# Patient Record
Sex: FEMALE | Race: WHITE | Employment: OTHER | URBAN - METROPOLITAN AREA
[De-identification: names, ages, dates, MRNs, and addresses within clinical notes are randomized per-mention and may not be internally consistent; named-entity substitution may affect disease eponyms.]

---

## 2019-01-01 ENCOUNTER — RECORDS - HEALTHEAST (OUTPATIENT)
Dept: LAB | Facility: CLINIC | Age: 84
End: 2019-01-01

## 2019-01-01 ENCOUNTER — TELEPHONE (OUTPATIENT)
Dept: GERIATRICS | Facility: CLINIC | Age: 84
End: 2019-01-01

## 2019-01-01 ENCOUNTER — ASSISTED LIVING VISIT (OUTPATIENT)
Dept: GERIATRICS | Facility: CLINIC | Age: 84
End: 2019-01-01
Payer: COMMERCIAL

## 2019-01-01 ENCOUNTER — TRANSFERRED RECORDS (OUTPATIENT)
Dept: HEALTH INFORMATION MANAGEMENT | Facility: CLINIC | Age: 84
End: 2019-01-01

## 2019-01-01 VITALS
BODY MASS INDEX: 33.67 KG/M2 | SYSTOLIC BLOOD PRESSURE: 146 MMHG | HEART RATE: 60 BPM | DIASTOLIC BLOOD PRESSURE: 60 MMHG | WEIGHT: 215 LBS | TEMPERATURE: 98.5 F

## 2019-01-01 VITALS
TEMPERATURE: 98 F | DIASTOLIC BLOOD PRESSURE: 69 MMHG | BODY MASS INDEX: 35.02 KG/M2 | HEART RATE: 62 BPM | SYSTOLIC BLOOD PRESSURE: 139 MMHG | RESPIRATION RATE: 18 BRPM | WEIGHT: 223.6 LBS

## 2019-01-01 DIAGNOSIS — B07.8 COMMON WART: Primary | ICD-10-CM

## 2019-01-01 DIAGNOSIS — I10 BENIGN ESSENTIAL HYPERTENSION: ICD-10-CM

## 2019-01-01 DIAGNOSIS — E55.9 VITAMIN D DEFICIENCY: Primary | ICD-10-CM

## 2019-01-01 DIAGNOSIS — F32.A DEPRESSION, UNSPECIFIED DEPRESSION TYPE: ICD-10-CM

## 2019-01-01 DIAGNOSIS — F32.A DEPRESSION, UNSPECIFIED DEPRESSION TYPE: Primary | ICD-10-CM

## 2019-01-01 DIAGNOSIS — E66.01 MORBID OBESITY (H): ICD-10-CM

## 2019-01-01 LAB
25(OH)D3 SERPL-MCNC: 8 NG/ML (ref 30–80)
ANION GAP SERPL CALCULATED.3IONS-SCNC: 7 MMOL/L (ref 5–18)
ANION GAP SERPL CALCULATED.3IONS-SCNC: 7 MMOL/L (ref 5–18)
BASOPHILS # BLD AUTO: 0 THOU/UL (ref 0–0.2)
BASOPHILS NFR BLD AUTO: 1 % (ref 0–2)
BUN SERPL-MCNC: 20 MG/DL (ref 8–28)
BUN SERPL-MCNC: 20 MG/DL (ref 8–28)
CALCIUM SERPL-MCNC: 9.2 MG/DL (ref 8.5–10.5)
CALCIUM SERPL-MCNC: 9.2 MG/DL (ref 8.5–10.5)
CHLORIDE BLD-SCNC: 109 MMOL/L (ref 98–107)
CHLORIDE SERPLBLD-SCNC: 109 MMOL/L (ref 98–107)
CO2 SERPL-SCNC: 27 MMOL/L (ref 22–31)
CO2 SERPL-SCNC: 27 MMOL/L (ref 22–31)
CREAT SERPL-MCNC: 0.89 MG/DL (ref 0.6–1.1)
CREAT SERPL-MCNC: 0.89 MG/DL (ref 0.6–1.1)
DIFFERENTIAL: NORMAL
EOSINOPHIL # BLD AUTO: 0 THOU/UL (ref 0–0.4)
EOSINOPHIL NFR BLD AUTO: 0 % (ref 0–6)
ERYTHROCYTE [DISTWIDTH] IN BLOOD BY AUTOMATED COUNT: 13.7 % (ref 11–14.5)
ERYTHROCYTE [DISTWIDTH] IN BLOOD BY AUTOMATED COUNT: 13.7 % (ref 11–14.5)
GFR SERPL CREATININE-BSD FRML MDRD: 59 ML/MIN/1.73M2
GFR SERPL CREATININE-BSD FRML MDRD: 59 ML/MIN/1.73M2
GLUCOSE BLD-MCNC: 124 MG/DL (ref 70–125)
GLUCOSE SERPL-MCNC: 124 MG/DL (ref 70–125)
HCT VFR BLD AUTO: 40.4 % (ref 35–47)
HCT VFR BLD AUTO: 40.4 % (ref 35–47)
HEMOGLOBIN: 13.4 G/DL (ref 12–16)
HGB BLD-MCNC: 13.4 G/DL (ref 12–16)
LYMPHOCYTES # BLD AUTO: 1.7 THOU/UL (ref 0.8–4.4)
LYMPHOCYTES NFR BLD AUTO: 26 % (ref 20–40)
MCH RBC QN AUTO: 33.2 PG (ref 27–34)
MCH RBC QN AUTO: 33.2 PG (ref 27–34)
MCHC RBC AUTO-ENTMCNC: 33.2 G/DL (ref 32–36)
MCHC RBC AUTO-ENTMCNC: 33.2 G/DL (ref 32–36)
MCV RBC AUTO: 100 FL (ref 80–100)
MCV RBC AUTO: 100 FL (ref 80–100)
MONOCYTES # BLD AUTO: 0.5 THOU/UL (ref 0–0.9)
MONOCYTES NFR BLD AUTO: 8 % (ref 2–10)
NEUTROPHILS # BLD AUTO: 4.1 THOU/UL (ref 2–7.7)
NEUTROPHILS NFR BLD AUTO: 65 % (ref 50–70)
PLATELET # BLD AUTO: 189 THOU/UL (ref 140–440)
PLATELET # BLD AUTO: 189 THOU/UL (ref 140–440)
PMV BLD AUTO: 11 FL (ref 8.5–12.5)
POTASSIUM BLD-SCNC: 3.8 MMOL/L (ref 3.5–5)
POTASSIUM SERPL-SCNC: 3.8 MMOL/L (ref 3.5–5)
RBC # BLD AUTO: 4.04 MILL/UL (ref 3.8–5.4)
RBC # BLD AUTO: 4.04 MILL/UL (ref 3.8–5.4)
SODIUM SERPL-SCNC: 143 MMOL/L (ref 136–145)
SODIUM SERPL-SCNC: 143 MMOL/L (ref 136–145)
WBC # BLD AUTO: 6.4 THOU/UL (ref 4–11)
WBC: 6.4 THOU/UL (ref 4–11)

## 2019-01-01 PROCEDURE — 17110 DESTRUCTION B9 LES UP TO 14: CPT | Performed by: NURSE PRACTITIONER

## 2019-01-01 RX ORDER — CHOLECALCIFEROL (VITAMIN D3) 25 MCG
TABLET ORAL
Qty: 28 TABLET | Refills: 98 | Status: SHIPPED | OUTPATIENT
Start: 2019-01-01 | End: 2020-01-01

## 2019-01-01 RX ORDER — SERTRALINE HYDROCHLORIDE 25 MG/1
25 TABLET, FILM COATED ORAL DAILY
COMMUNITY
End: 2019-01-01

## 2019-01-01 RX ORDER — AMLODIPINE BESYLATE 5 MG/1
2.5 TABLET ORAL AT BEDTIME
Qty: 28 TABLET | Refills: 98
Start: 2019-01-01 | End: 2020-01-01 | Stop reason: ALTCHOICE

## 2019-01-01 RX ORDER — SERTRALINE HYDROCHLORIDE 25 MG/1
TABLET, FILM COATED ORAL
Qty: 31 TABLET | Refills: 98 | Status: SHIPPED | OUTPATIENT
Start: 2019-01-01 | End: 2020-01-01 | Stop reason: ALTCHOICE

## 2019-03-12 RX ORDER — IBUPROFEN 400 MG/1
400 TABLET, FILM COATED ORAL 2 TIMES DAILY PRN
COMMUNITY
End: 2019-06-01 | Stop reason: ALTCHOICE

## 2019-03-12 RX ORDER — LANOLIN ALCOHOL/MO/W.PET/CERES
3 CREAM (GRAM) TOPICAL AT BEDTIME
COMMUNITY
End: 2020-01-01

## 2019-03-12 RX ORDER — LOSARTAN POTASSIUM 50 MG/1
50 TABLET ORAL 2 TIMES DAILY
COMMUNITY
End: 2019-07-02

## 2019-03-12 RX ORDER — ACETAMINOPHEN 500 MG
500 TABLET ORAL EVERY 6 HOURS PRN
COMMUNITY
End: 2019-06-01 | Stop reason: ALTCHOICE

## 2019-03-12 RX ORDER — AMOXICILLIN 250 MG
1 CAPSULE ORAL 2 TIMES DAILY PRN
COMMUNITY
End: 2019-06-01 | Stop reason: ALTCHOICE

## 2019-03-12 RX ORDER — METOPROLOL SUCCINATE 100 MG/1
100 TABLET, EXTENDED RELEASE ORAL DAILY
COMMUNITY
End: 2019-07-02

## 2019-03-12 RX ORDER — AMLODIPINE BESYLATE 5 MG/1
5 TABLET ORAL DAILY
COMMUNITY
End: 2019-05-28

## 2019-03-12 RX ORDER — LATANOPROST 50 UG/ML
1 SOLUTION/ DROPS OPHTHALMIC AT BEDTIME
COMMUNITY
End: 2019-11-02

## 2019-03-12 NOTE — PROGRESS NOTES
[place] [facility]  PRIMARY CARE PROVIDER AND CLINIC:  Dee Mock, APRN CNP, [address] / [place] [address]  Chief Complaint   Patient presents with     Establish Care     [place] Medical Record Number:  [id number]  Place of Service where encounter took place:  [facility] ([facility]) [[id number]]    Radha Smith  is a 89 year old  (12/14/1925), living in above facility since 12/21/18 and now choosing to change PCPs to [facility]. .  Admitted to this facility for  rehab, medical management and nursing care.    HPI:    HPI information obtained from: facility chart records, facility staff, patient report, [facility] chart review and Care Everywhere [facility] chart review.     Brief Summary of Hospital Course:     This is a 89-year-old female, with a past medical history significant for left basal ganglia hemorrhagic stroke in September 2018, stroke in 2011, vascular dementia, bradycardia, chronic systolic heart failure with EF 40-45% on 2/21/18, hypertension and glaucoma, who was admitted to [facility] 12/20/18 following hospitalization at [facility] 12/1/18 through 12/3/18 for acute encephalopathy. MRI and CT revealed no acute findings. Patient appeared to be at baseline. Daughter unable to care for patient at home.Discharged to [facility] T[facility] Critical access hospital.     While in the TCU 12/3/18 through 12/20/18, Metoprolol was decreased due to bradycardia and Losartan was initiated. Refused medications at times. Recommend 2WW although patient was noted to ambulate without assistive device at times. Tinetti Score 18/28. Unable to complete SLUMS and cognitive testing as patient was refusing and confused.     Updates on Status Since Skilled nursing Admission:     Daughter, Fariba, present during today's visit. Patient has lived in [place] her entire life. Has 3 daughters. Worked outside of the home at the [employer]. Had right  lower back pain yesterday, but this has since subsided. Has no other complaints of pain. Is able to state her age after thinking for some time.     Per daughter's report, who lives 1 minute from AL facility, patient is generally healthy. Requests patient only be seen when necessary for acute visits. Would like all paper information and lab reports made available to her.     CODE STATUS/ADVANCE DIRECTIVES DISCUSSION:   DNR/DNI  Patient's living condition: lives in an assisted living facility  ALLERGIES: Lisinopril; Morphine; Penicillins; and Sulfa drugs  PAST MEDICAL HISTORY:  has no past medical history on file.  PAST SURGICAL HISTORY:   has no past surgical history on file.  FAMILY HISTORY: family history is not on file.  SOCIAL HISTORY:       Post Discharge Medication Reconciliation Status: discharge medications reconciled and changed, per note/orders (see AVS)    Current Outpatient Medications   Medication Sig Dispense Refill     acetaminophen (TYLENOL) 500 MG tablet Take 500-1,000 mg by mouth every 6 hours as needed for mild pain       amLODIPine (NORVASC) 5 MG tablet Take 10 mg by mouth daily        ibuprofen (ADVIL/MOTRIN) 400 MG tablet Take 400 mg by mouth 2 times daily as needed for moderate pain       latanoprost (XALATAN) 0.005 % ophthalmic solution Place 1 drop into both eyes At Bedtime       losartan (COZAAR) 50 MG tablet Take 50 mg by mouth 2 times daily       melatonin 3 MG tablet Take 3 mg by mouth At Bedtime       metoprolol succinate ER (TOPROL-XL) 100 MG 24 hr tablet Take 100 mg by mouth daily Hold if SBP <100       senna-docusate (SENOKOT-S/PERICOLACE) 8.6-50 MG tablet Take 1 tablet by mouth 2 times daily as needed for constipation       ROS:  10 point ROS of systems including Constitutional, Eyes, Respiratory, Cardiovascular, Gastroenterology, Genitourinary, Integumentary, Musculoskeletal, Psychiatric were all negative except for pertinent positives noted in my HPI.    Vitals:  /61   Pulse  61   Temp 98.4  F (36.9  C)   Resp (!) 60   Wt 91.2 kg (201 lb)   SpO2 96%   Exam:  GENERAL APPEARANCE:  Alert, in no distress  ENT:  Mouth and posterior oropharynx normal, moist mucous membranes  EYES:  EOM, conjunctivae, lids, pupils and irises normal  RESP:  respiratory effort and palpation of chest normal, lungs clear to auscultation , no respiratory distress  CV:  Palpation and auscultation of heart done , regular rate and rhythm, no murmur, rub, or gallop  ABDOMEN:  normal bowel sounds, soft, nontender, no hepatosplenomegaly or other masses  M/S:   Active movement of bilateral upper and lower extremities. No edema.  SKIN:  Inspection of skin and subcutaneous tissue baseline, Palpation of skin and subcutaneous tissue baseline  NEURO:   Cranial nerves 2-12 are normal tested and grossly at patient's baseline  PSYCH:  memory impaired     Lab/Diagnostic data:  Labs done in SNF are in [employer]. Please refer to them using [employer] Everywhere.    ASSESSMENT/PLAN:  Vascular Dementia. Chronic, progressive. Resides in [facility]. No recent cognitive testing available to review as patient refused during most recent TCU stay. Able to state her name and age. Required prompting from daughter regarding her career outside the home. Staff to assist with meals and ADLs.     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 Per patient report, no residual effects from most recent stroke. Daughter does not report otherwise. Discussed hospice during hospitalization in September 2018, but patient appears to have made some progress in therapies.     Hypertension. Upon review of blood pressure over the past 5 days, systolic range from 142-178. Diastolic range from 68-87. Based on elevated blood pressures, discussed with daughter and patient increasing Amlodipine from 5 mg to 10 mg. Continue Metoprolol and Losartan as ordered. Blood pressures currently being monitored daily.      Bradycardia. Per history. Limited heart rates available to review since AL admission. Range 60s. Taking Metoprolol. Monitor heart rate monthly.     Chronic Systolic Heart Failure with EF 40-45% on 18. Monitor weights. Takes Metoprolol.    Glaucoma. Continue Latanoprost as ordered. Daughter reports a follow-up appointment with Ophthalmologist has been scheduled for April.     Insomnia. Continue Melatonin as ordered.    Constipation. Continue Senna-S as ordered.     Total time with an established patient visit in the assisted livin minutes including discussions about the POC and care coordination with the patient and familyFariba. Greater than 50% of total time spent with counseling and coordinating care due to review of past medical record, review of facility documentation and discussion with patient and daughter     Electronically signed by:  DAREN Nathan CNP

## 2019-03-13 ENCOUNTER — ASSISTED LIVING VISIT (OUTPATIENT)
Dept: GERIATRICS | Facility: CLINIC | Age: 84
End: 2019-03-13
Payer: COMMERCIAL

## 2019-03-13 VITALS
OXYGEN SATURATION: 96 % | HEART RATE: 61 BPM | SYSTOLIC BLOOD PRESSURE: 156 MMHG | WEIGHT: 201 LBS | RESPIRATION RATE: 60 BRPM | DIASTOLIC BLOOD PRESSURE: 61 MMHG | TEMPERATURE: 98.4 F

## 2019-03-13 DIAGNOSIS — G47.00 INSOMNIA, UNSPECIFIED TYPE: ICD-10-CM

## 2019-03-13 DIAGNOSIS — I10 BENIGN ESSENTIAL HYPERTENSION: ICD-10-CM

## 2019-03-13 DIAGNOSIS — F01.50 VASCULAR DEMENTIA WITHOUT BEHAVIORAL DISTURBANCE (H): Primary | ICD-10-CM

## 2019-03-13 DIAGNOSIS — H40.9 GLAUCOMA, UNSPECIFIED GLAUCOMA TYPE, UNSPECIFIED LATERALITY: ICD-10-CM

## 2019-03-13 DIAGNOSIS — K59.00 CONSTIPATION, UNSPECIFIED CONSTIPATION TYPE: ICD-10-CM

## 2019-03-13 DIAGNOSIS — R00.1 BRADYCARDIA: ICD-10-CM

## 2019-03-13 DIAGNOSIS — I50.22 CHRONIC SYSTOLIC HEART FAILURE (H): ICD-10-CM

## 2019-03-13 DIAGNOSIS — Z86.73 HISTORY OF CVA (CEREBROVASCULAR ACCIDENT): ICD-10-CM

## 2019-03-13 NOTE — LETTER
3/13/2019        RE: Radha Smith  [facility]  [address]  [place] [address]        [place] [facility]  PRIMARY CARE PROVIDER AND CLINIC:  Dee Mock, APRN CNP, [address] / [place] [address]  Chief Complaint   Patient presents with     Establish Care     [place] Medical Record Number:  [id number]  Place of Service where encounter took place:  [facility] ([facility]) [[id number]]    Radha Smith  is a 89 year old  (12/14/1925), living in above facility since 12/21/18 and now choosing to change PCPs to [facility]. .  Admitted to this facility for  rehab, medical management and nursing care.    HPI:    HPI information obtained from: facility chart records, facility staff, patient report, [facility] chart review and Care Everywhere [facility] chart review.     Brief Summary of Hospital Course:     This is a 89-year-old female, with a past medical history significant for left basal ganglia hemorrhagic stroke in September 2018, stroke in 2011, vascular dementia, bradycardia, chronic systolic heart failure with EF 40-45% on 2/21/18, hypertension and glaucoma, who was admitted to [facility] 12/20/18 following hospitalization at [facility] 12/1/18 through 12/3/18 for acute encephalopathy. MRI and CT revealed no acute findings. Patient appeared to be at baseline. Daughter unable to care for patient at home.Discharged to [facility] [place].     While in the TCU 12/3/18 through 12/20/18, Metoprolol was decreased due to bradycardia and Losartan was initiated. Refused medications at times. Recommend 2WW although patient was noted to ambulate without assistive device at times. Tinetti Score 18/28. Unable to complete SLUMS and cognitive testing as patient was refusing and confused.     Updates on Status Since Skilled nursing Admission:     Daughter, Fariba, present during today's visit. Patient has  lived in [place] her entire life. Has 3 daughters. Worked outside of the home at the [employer]. Had right lower back pain yesterday, but this has since subsided. Has no other complaints of pain. Is able to state her age after thinking for some time.     Per daughter's report, who lives 1 minute from AL facility, patient is generally healthy. Requests patient only be seen when necessary for acute visits. Would like all paper information and lab reports made available to her.     CODE STATUS/ADVANCE DIRECTIVES DISCUSSION:   DNR/DNI  Patient's living condition: lives in an assisted living facility  ALLERGIES: Lisinopril; Morphine; Penicillins; and Sulfa drugs  PAST MEDICAL HISTORY:  has no past medical history on file.  PAST SURGICAL HISTORY:   has no past surgical history on file.  FAMILY HISTORY: family history is not on file.  SOCIAL HISTORY:       Post Discharge Medication Reconciliation Status: discharge medications reconciled and changed, per note/orders (see AVS)    Current Outpatient Medications   Medication Sig Dispense Refill     acetaminophen (TYLENOL) 500 MG tablet Take 500-1,000 mg by mouth every 6 hours as needed for mild pain       amLODIPine (NORVASC) 5 MG tablet Take 10 mg by mouth daily        ibuprofen (ADVIL/MOTRIN) 400 MG tablet Take 400 mg by mouth 2 times daily as needed for moderate pain       latanoprost (XALATAN) 0.005 % ophthalmic solution Place 1 drop into both eyes At Bedtime       losartan (COZAAR) 50 MG tablet Take 50 mg by mouth 2 times daily       melatonin 3 MG tablet Take 3 mg by mouth At Bedtime       metoprolol succinate ER (TOPROL-XL) 100 MG 24 hr tablet Take 100 mg by mouth daily Hold if SBP <100       senna-docusate (SENOKOT-S/PERICOLACE) 8.6-50 MG tablet Take 1 tablet by mouth 2 times daily as needed for constipation       ROS:  10 point ROS of systems including Constitutional, Eyes, Respiratory, Cardiovascular, Gastroenterology, Genitourinary, Integumentary,  Musculoskeletal, Psychiatric were all negative except for pertinent positives noted in my HPI.    Vitals:  /61   Pulse 61   Temp 98.4  F (36.9  C)   Resp (!) 60   Wt 91.2 kg (201 lb)   SpO2 96%   Exam:  GENERAL APPEARANCE:  Alert, in no distress  ENT:  Mouth and posterior oropharynx normal, moist mucous membranes  EYES:  EOM, conjunctivae, lids, pupils and irises normal  RESP:  respiratory effort and palpation of chest normal, lungs clear to auscultation , no respiratory distress  CV:  Palpation and auscultation of heart done , regular rate and rhythm, no murmur, rub, or gallop  ABDOMEN:  normal bowel sounds, soft, nontender, no hepatosplenomegaly or other masses  M/S:   Active movement of bilateral upper and lower extremities. No edema.  SKIN:  Inspection of skin and subcutaneous tissue baseline, Palpation of skin and subcutaneous tissue baseline  NEURO:   Cranial nerves 2-12 are normal tested and grossly at patient's baseline  PSYCH:  memory impaired     Lab/Diagnostic data:  Labs done in SNF are in [place] [employer]. Please refer to them using [employer] Everywhere.    ASSESSMENT/PLAN:  Vascular Dementia. Chronic, progressive. Resides in [facility]. No recent cognitive testing available to review as patient refused during most recent TCU stay. Able to state her name and age. Required prompting from daughter regarding her career outside the home. Staff to assist with meals and ADLs.     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 Per patient report, no residual effects from most recent stroke. Daughter does not report otherwise. Discussed hospice during hospitalization in September 2018, but patient appears to have made some progress in therapies.     Hypertension. Upon review of blood pressure over the past 5 days, systolic range from 142-178. Diastolic range from 68-87. Based on elevated blood pressures, discussed with daughter and patient increasing  Amlodipine from 5 mg to 10 mg. Continue Metoprolol and Losartan as ordered. Blood pressures currently being monitored daily.     Bradycardia. Per history. Limited heart rates available to review since AL admission. Range 60s. Taking Metoprolol. Monitor heart rate monthly.     Chronic Systolic Heart Failure with EF 40-45% on 18. Monitor weights. Takes Metoprolol.    Glaucoma. Continue Latanoprost as ordered. Daughter reports a follow-up appointment with Ophthalmologist has been scheduled for April.     Insomnia. Continue Melatonin as ordered.    Constipation. Continue Senna-S as ordered.     Total time with an established patient visit in the assisted livin minutes including discussions about the POC and care coordination with the patient and family, Fariba. Greater than 50% of total time spent with counseling and coordinating care due to review of past medical record, review of facility documentation and discussion with patient and daughter     Electronically signed by:  DAREN Nathan CNP                       Sincerely,        DAREN Nathan CNP

## 2019-03-18 PROBLEM — Z76.89 HEALTH CARE HOME: Status: ACTIVE | Noted: 2019-03-18

## 2019-04-01 ENCOUNTER — PATIENT OUTREACH (OUTPATIENT)
Dept: GERIATRIC MEDICINE | Facility: CLINIC | Age: 84
End: 2019-04-01

## 2019-04-03 ENCOUNTER — ASSISTED LIVING VISIT (OUTPATIENT)
Dept: GERIATRICS | Facility: CLINIC | Age: 84
End: 2019-04-03
Payer: COMMERCIAL

## 2019-04-03 VITALS — SYSTOLIC BLOOD PRESSURE: 160 MMHG | DIASTOLIC BLOOD PRESSURE: 94 MMHG | WEIGHT: 192.8 LBS

## 2019-04-03 DIAGNOSIS — I10 BENIGN ESSENTIAL HYPERTENSION: Primary | ICD-10-CM

## 2019-04-03 DIAGNOSIS — I50.22 CHRONIC SYSTOLIC HEART FAILURE (H): ICD-10-CM

## 2019-04-03 DIAGNOSIS — R60.0 BILATERAL LOWER EXTREMITY EDEMA: ICD-10-CM

## 2019-04-03 NOTE — LETTER
4/3/2019        [facility]  [address]  [facility] [address]        [place] [facility]  [place] Medical Record Number:  [id number]  [facility] [[id number][facility]  Chief Complaint   Patient presents with     RECHECK     HPI:    Radha Smith  is a 89 year old (12/14/1925), who is being seen today for an episodic care visit.  HPI information obtained from: facility chart records, facility staff, patient report, [facility] chart review, Care Everywhere [facility] chart review and family/first contact daughter, Shawna, report. Today's concern is:    Hypertension. Amlodipine increased on 3/13/19 due to elevated blood pressures. Upon review of documentation over the past 7 days, systolic range from 124-168, most 120-140s. Diastolic range from 59-83.    Bilateral Lower Extremity Edema. Daughter requested patient be seen due to increased swelling in bilateral lower legs. Per daughter's report, patient noted to have swollen feet and ankles a few weeks ago. Recently saw Podiatry for redness that was noted between her 1st and 2nd toe on the right foot. Has been using a band aid on them. Per patient's report, denies shortness of breath. Is not sure she would want to wear compression stockings if indicated.     Chronic Systolic Heart Failure with EF 40-45% on 2/21/18. Weight 192.8 lbs on 12/21/18. No other weights available to review.     Past Medical and Surgical History reviewed in Epic today.    MEDICATIONS:  Current Outpatient Medications   Medication Sig Dispense Refill     acetaminophen (TYLENOL) 500 MG tablet Take 500 mg by mouth every 6 hours as needed for mild pain        amLODIPine (NORVASC) 5 MG tablet Take 10 mg by mouth daily        ibuprofen (ADVIL/MOTRIN) 400 MG tablet Take 400 mg by mouth 2 times daily as needed for moderate pain       latanoprost (XALATAN) 0.005 %  ophthalmic solution Place 1 drop into both eyes At Bedtime       losartan (COZAAR) 50 MG tablet Take 50 mg by mouth 2 times daily       melatonin 3 MG tablet Take 3 mg by mouth At Bedtime       metoprolol succinate ER (TOPROL-XL) 100 MG 24 hr tablet Take 100 mg by mouth daily Hold if SBP <100       senna-docusate (SENOKOT-S/PERICOLACE) 8.6-50 MG tablet Take 1 tablet by mouth 2 times daily as needed for constipation       REVIEW OF SYSTEMS:  Limited secondary to cognitive impairment but today pt reports no shortness of breath    Objective:  BP (!) 160/94   Wt 87.5 kg (192 lb 12.8 oz)   Exam:  GENERAL APPEARANCE:  Alert, in no distress  ENT:  Mouth and posterior oropharynx normal, moist mucous membranes  EYES:  EOM, conjunctivae, lids, pupils and irises normal  RESP:  respiratory effort and palpation of chest normal, lungs clear to auscultation , no respiratory distress  CV:  Palpation and auscultation of heart done , regular rate and rhythm, no murmur, rub, or gallop  ABDOMEN:  normal bowel sounds, soft, nontender, no hepatosplenomegaly or other masses  M/S:   Active movement of bilateral upper and lower extremities.Trace edema in bilateral ankles and feet..  SKIN:   Erythema noted on medial aspect of 1st and 2nd digit on right foot.   NEURO:   Cranial nerves 2-12 are normal tested and grossly at patient's baseline  PSYCH:  memory impaired     Labs:   Labs done in SNF are in [facility]. Please refer to them using [employer]/Care Everywhere.    ASSESSMENT/PLAN:  Hypertension. Most blood pressures at goal. Given age and co-morbidities, would hesitate to have blood pressure too low. Will decrease Amlodipine back to 5 mg daily due to increased edema in BLE and this being bothersome to patient and family. Continue to monitor blood pressures  Also on Metoprolol and Losartan.     Bilateral Lower Extremity Edema. As timing of edema correlates with increase in Amlodipine, will decrease Amlodipine back to 5 mg PO daily. No  recent weights available to review. Appears euvolemic on examination. Encourage elevation. Patient is not interested in compression stockings at this time.    Chronic Systolic Heart Failure with EF 40-45% on 2/21/18. Monitor weights. Requested staff take monthly weights at a minimum. Takes Metoprolol.     Electronically signed by:  DAREN Nathan CNP             Sincerely,        DAREN Nathan CNP

## 2019-04-03 NOTE — PROGRESS NOTES
[place] [facility]  [place] Medical Record Number:  [id number]  Place of Service where encounter took place:  [facility] ([facility]) [[id number]]  Chief Complaint   Patient presents with     RECHECK     HPI:    Radha Smith  is a 89 year old (12/14/1925), who is being seen today for an episodic care visit.  HPI information obtained from: facility chart records, facility staff, patient report, [facility] chart review, Care Everywhere [facility] chart review and family/first contact daughter, Shawna, report. Today's concern is:    Hypertension. Amlodipine increased on 3/13/19 due to elevated blood pressures. Upon review of documentation over the past 7 days, systolic range from 124-168, most 120-140s. Diastolic range from 59-83.    Bilateral Lower Extremity Edema. Daughter requested patient be seen due to increased swelling in bilateral lower legs. Per daughter's report, patient noted to have swollen feet and ankles a few weeks ago. Recently saw Podiatry for redness that was noted between her 1st and 2nd toe on the right foot. Has been using a band aid on them. Per patient's report, denies shortness of breath. Is not sure she would want to wear compression stockings if indicated.     Chronic Systolic Heart Failure with EF 40-45% on 2/21/18. Weight 192.8 lbs on 12/21/18. No other weights available to review.     Past Medical and Surgical History reviewed in Epic today.    MEDICATIONS:  Current Outpatient Medications   Medication Sig Dispense Refill     acetaminophen (TYLENOL) 500 MG tablet Take 500 mg by mouth every 6 hours as needed for mild pain        amLODIPine (NORVASC) 5 MG tablet Take 10 mg by mouth daily        ibuprofen (ADVIL/MOTRIN) 400 MG tablet Take 400 mg by mouth 2 times daily as needed for moderate pain       latanoprost (XALATAN) 0.005 % ophthalmic solution Place 1 drop into both eyes At Bedtime       losartan (COZAAR) 50 MG tablet Take 50 mg by mouth 2 times  daily       melatonin 3 MG tablet Take 3 mg by mouth At Bedtime       metoprolol succinate ER (TOPROL-XL) 100 MG 24 hr tablet Take 100 mg by mouth daily Hold if SBP <100       senna-docusate (SENOKOT-S/PERICOLACE) 8.6-50 MG tablet Take 1 tablet by mouth 2 times daily as needed for constipation       REVIEW OF SYSTEMS:  Limited secondary to cognitive impairment but today pt reports no shortness of breath    Objective:  BP (!) 160/94   Wt 87.5 kg (192 lb 12.8 oz)   Exam:  GENERAL APPEARANCE:  Alert, in no distress  ENT:  Mouth and posterior oropharynx normal, moist mucous membranes  EYES:  EOM, conjunctivae, lids, pupils and irises normal  RESP:  respiratory effort and palpation of chest normal, lungs clear to auscultation , no respiratory distress  CV:  Palpation and auscultation of heart done , regular rate and rhythm, no murmur, rub, or gallop  ABDOMEN:  normal bowel sounds, soft, nontender, no hepatosplenomegaly or other masses  M/S:   Active movement of bilateral upper and lower extremities.Trace edema in bilateral ankles and feet..  SKIN:  Erythema noted on medial aspect of 1st and 2nd digit on right foot.   NEURO:   Cranial nerves 2-12 are normal tested and grossly at patient's baseline  PSYCH:  memory impaired     Labs:   Labs done in SNF are in [facility]. Please refer to them using [employer]/Care Everywhere.    ASSESSMENT/PLAN:  Hypertension. Most blood pressures at goal. Given age and co-morbidities, would hesitate to have blood pressure too low. Will decrease Amlodipine back to 5 mg daily due to increased edema in BLE and this being bothersome to patient and family. Continue to monitor blood pressures  Also on Metoprolol and Losartan.     Bilateral Lower Extremity Edema. As timing of edema correlates with increase in Amlodipine, will decrease Amlodipine back to 5 mg PO daily. No recent weights available to review. Appears euvolemic on examination. Encourage elevation. Patient is not interested in  compression stockings at this time.    Chronic Systolic Heart Failure with EF 40-45% on 2/21/18. Monitor weights. Requested staff take monthly weights at a minimum. Takes Metoprolol.     Electronically signed by:  DAREN Nathan CNP

## 2019-05-28 VITALS
DIASTOLIC BLOOD PRESSURE: 64 MMHG | TEMPERATURE: 98.7 F | SYSTOLIC BLOOD PRESSURE: 118 MMHG | RESPIRATION RATE: 16 BRPM | HEART RATE: 63 BPM | WEIGHT: 192.8 LBS

## 2019-05-28 DIAGNOSIS — I10 BENIGN ESSENTIAL HYPERTENSION: Primary | ICD-10-CM

## 2019-05-28 RX ORDER — AMLODIPINE BESYLATE 5 MG/1
5 TABLET ORAL DAILY
Qty: 28 TABLET | Refills: 98 | Status: SHIPPED | OUTPATIENT
Start: 2019-05-28 | End: 2019-01-01

## 2019-05-28 RX ORDER — AMLODIPINE BESYLATE 5 MG/1
TABLET ORAL
Qty: 28 TABLET | Refills: 98 | Status: SHIPPED | OUTPATIENT
Start: 2019-05-28 | End: 2019-06-01

## 2019-05-28 NOTE — PROGRESS NOTES
"[facility]  PRIMARY CARE PROVIDER AND CLINIC:  Dee Mock, APRN CNP, [address] / [place] [address]  Chief Complaint   Patient presents with     Hospital F/U     [facility] Medical Record Number:  [id number]  Place of Service where encounter took place:  [facility] [[id number]]    Radha Smith  is a 89 year old  (12/14/1925), admitted to the above facility from  [facility]. Hospital stay 5/2/19 through 5/5/19. Patient admitted from [facility] 5/5/19 through 5/22/19. Admitted to this facility for  rehab, medical management and nursing care.    HPI:    HPI information obtained from: facility chart records, facility staff, patient report, [facility] chart review, Care Everywhere Epic chart review and family/first contact daughterFariba, report.     Brief Summary of Hospital Course:     This is a 89-year-old female, with a past medical history significant for left basal ganglia hemorrhagic stroke in September 2018, stroke in 2011, vascular dementia, bradycardia, chronic systolic heart failure with EF 40-45% on 2/21/18, hypertension and glaucoma, who was admitted to [facility] with right hip pain after a fall after tripping on carpet. An right pelvic and hip x-ray revealed \"1. Comminuted intertrochanteric fracture of the proximal right femur\". Ortho was consulted and an open reduction internal fixation of the right hip was performed on 5/2/19 by Dr. Mayer. Enoxaparin was ordered for 3 week for DVT prophylaxis. Hemoglobin 14 on 5/2/19 -> 10.7 on 5/5/19. Experienced agitation during hospitalization. Was discharged to [facility] for ongoing physical rehabilitation.    While at [facility], was unable to bear much weight on right leg due to weakness and pain at discharge. Requiring EZ stands for transfers. Hospital bed ordered at discharge. Was not eating well. Acetaminophen " "ordered for pain control. Weight 204 lbs on 5/21/19. Repeat Hemoglobin 11.3 on 5/7/19.    Updates on Status Since Skilled nursing Admission:     Complains of right hip pain \"occasionally\" when sitting for long periods of time and transferring to bed. Denies numbness or tingling.     Per daughter, Shawna, report, cannot get patient in and out of car. Uncertain how they will get patient to Ortho appointment in June. Concerned that there was redness outlined around the incisions. Also noted while her mother was sitting on the toilet that 1 suture was not removed.     CODE STATUS/ADVANCE DIRECTIVES DISCUSSION:   DNR only  Patient's living condition: lives in an assisted living facility  ALLERGIES: Lisinopril; Morphine; Penicillins; and Sulfa drugs  PAST MEDICAL HISTORY:  has no past medical history on file.  PAST SURGICAL HISTORY:   has no past surgical history on file.  FAMILY HISTORY: family history is not on file.  SOCIAL HISTORY:       Post Discharge Medication Reconciliation Status: discharge medications reconciled and changed, per note/orders (see AVS)    Current Outpatient Medications   Medication Sig Dispense Refill     acetaminophen (TYLENOL) 500 MG tablet Take 1,000 mg by mouth daily And BID PRN       amLODIPine (NORVASC) 5 MG tablet Take 1 tablet (5 mg) by mouth daily 28 tablet 98     latanoprost (XALATAN) 0.005 % ophthalmic solution Place 1 drop into both eyes At Bedtime       losartan (COZAAR) 50 MG tablet Take 50 mg by mouth 2 times daily       melatonin 3 MG tablet Take 3 mg by mouth At Bedtime       metoprolol succinate ER (TOPROL-XL) 100 MG 24 hr tablet Take 100 mg by mouth daily Hold if SBP <100       senna (SENOKOT) 8.6 MG tablet Take 1 tablet by mouth 2 times daily as needed for constipation       ROS:  Limited secondary to cognitive impairment but today pt reports pain as noted above    Vitals:  /64   Pulse 63   Temp 98.7  F (37.1  C)   Resp 16   Wt 87.5 kg (192 lb 12.8 oz) "   Exam:  GENERAL APPEARANCE:  Alert, in no distress  ENT:  Mouth and posterior oropharynx normal, moist mucous membranes  EYES:  EOM, conjunctivae, lids, pupils and irises normal  RESP:  respiratory effort and palpation of chest normal, lungs clear to auscultation , no respiratory distress  CV:  Palpation and auscultation of heart done , regular rate and rhythm, no murmur, rub, or gallop  ABDOMEN:  normal bowel sounds, soft, nontender, no hepatosplenomegaly or other masses  M/S:   Active movement of bilateral upper and lower extremities. No edema.  SKIN: 3 healing right hip incisions. Mild erythema noted within perimeter of marker outline. No drainage. 1 suture present  NEURO:   Cranial nerves 2-12 are normal tested and grossly at patient's baseline  PSYCH:  memory impaired    Lab/Diagnostic data:  Labs done in SNF are [facility]. Please refer to them using [employer]/Care Everywhere.    ASSESSMENT/PLAN:  Right Intertrochanteric Fracture of the Proximal Femur S/P ORIF on 5/2/19 by Dr. Hairston. [facility] H[facility]D to follow patient per daughter's request. Daughter prefers patient be seen on June 11 as this is around the time patient would be seen by Dr. Hairston. Repeat x-rays to be performed at that time. Completed 3 weeks of Enoxaparin injections for DVT prophylaxis. Will schedule Acetaminophen daily in the morning for pain control. Has hospital bed for transfers. Home Physical Therapy ordered. Home PT does not have a set schedule so cannot schedule Acetaminophen to correlate with PT times. Incision with no signs of infection. Mild erythema is likely secondary to skin being pulled together.    Anemia due Blood Loss, Acute. Secondary to above. Hemoglobin 14 on 5/2/19 -> 10.7 on 5/5/19. Repeat Hemoglobin 11.3 on 5/7/19. Will repeat Hemoglobin in the coming weeks to ensure stability.     Vascular Dementia. Chronic, progressive. Resides in secure memory care AL. Staff to assist with meals and ADLs.      History of Left Basal  Ganglia Hemorrhagic Stroke in 2018/CVA in . Discussed hospice during hospitalization in 2018, but patient appears to have made some progress in therapies. Patient and daughter report no residual effects.      Hypertension. Amlodipine increased from 5 mg to 10 mg on , but decreased back to 5 mg on 4/3/19 due to concerns related to increased edema. Monthly blood pressures. Continue Metoprolol as ordered.      Bradycardia. Per history. Limited heart rates available to review since AL admission. Range 60s. Taking Metoprolol. Monitor heart rate monthly.      Chronic Systolic Heart Failure with EF 40-45% on 18. Monitor weights. Takes Metoprolol. Last weight documented 204 lbs on 19 at TCU.      Glaucoma. Continue Latanoprost as ordered. Daughter reported a follow-up appointment with Ophthalmologist had been scheduled for April.      Insomnia. Continue Melatonin as ordered.     Constipation. Continue Senna as ordered    Total time with an established patient visit in the assisted livin minutes including discussions about the POC and care coordination with the patient and family, daughter Fariba. Greater than 50% of total time spent with counseling and coordinating care due to discussion with daughter, contacting home PT to determine visit schedule, communicating with [facility] PA-C, reviewing TCU notes and hospital notes     Electronically signed by:  DAREN Nathan CNP

## 2019-05-29 ENCOUNTER — ASSISTED LIVING VISIT (OUTPATIENT)
Dept: GERIATRICS | Facility: CLINIC | Age: 84
End: 2019-05-29
Payer: COMMERCIAL

## 2019-05-29 DIAGNOSIS — I10 BENIGN ESSENTIAL HYPERTENSION: ICD-10-CM

## 2019-05-29 DIAGNOSIS — H40.9 GLAUCOMA, UNSPECIFIED GLAUCOMA TYPE, UNSPECIFIED LATERALITY: ICD-10-CM

## 2019-05-29 DIAGNOSIS — Z47.89 AFTERCARE FOLLOWING SURGERY OF THE MUSCULOSKELETAL SYSTEM: Primary | ICD-10-CM

## 2019-05-29 DIAGNOSIS — G47.00 INSOMNIA, UNSPECIFIED TYPE: ICD-10-CM

## 2019-05-29 DIAGNOSIS — Z86.73 HISTORY OF CVA (CEREBROVASCULAR ACCIDENT): ICD-10-CM

## 2019-05-29 DIAGNOSIS — R00.1 BRADYCARDIA: ICD-10-CM

## 2019-05-29 DIAGNOSIS — K59.00 CONSTIPATION, UNSPECIFIED CONSTIPATION TYPE: ICD-10-CM

## 2019-05-29 DIAGNOSIS — D62 ANEMIA DUE TO BLOOD LOSS, ACUTE: ICD-10-CM

## 2019-05-29 DIAGNOSIS — I50.22 CHRONIC SYSTOLIC HEART FAILURE (H): ICD-10-CM

## 2019-05-29 DIAGNOSIS — F01.50 VASCULAR DEMENTIA WITHOUT BEHAVIORAL DISTURBANCE (H): ICD-10-CM

## 2019-05-29 NOTE — LETTER
"    5/29/2019        [facility]  [address]  [place] [address]        [facility]  PRIMARY CARE PROVIDER AND CLINIC:  Dee Mock, APRN CNP, [address] / [place] [address]  Chief Complaint   Patient presents with     Hospital F/U     [facility] Medical Record Number:  [id number]  Place of Service where encounter took place:  [facility] [[id number]]    Radha Smith  is a 89 year old  (12/14/1925), admitted to the above facility from  [facility]. Hospital stay 5/2/19 through 5/5/19. Patient admitted from [facility] 5/5/19 through 5/22/19. Admitted to this facility for  rehab, medical management and nursing care.    HPI:    HPI information obtained from: facility chart records, facility staff, patient report, [facility] chart review, Care Everywhere [facility] chart review and family/first contact daughterFariba, report.     Brief Summary of Hospital Course:     This is a 89-year-old female, with a past medical history significant for left basal ganglia hemorrhagic stroke in September 2018, stroke in 2011, vascular dementia, bradycardia, chronic systolic heart failure with EF 40-45% on 2/21/18, hypertension and glaucoma, who was admitted to [facility] with right hip pain after a fall after tripping on carpet. An right pelvic and hip x-ray revealed \"1. Comminuted intertrochanteric fracture of the proximal right femur\". Ortho was consulted and an open reduction internal fixation of the right hip was performed on 5/2/19 by Dr. Mayer. Enoxaparin was ordered for 3 week for DVT prophylaxis. Hemoglobin 14 on 5/2/19 -> 10.7 on 5/5/19. Experienced agitation during hospitalization. Was discharged to [facility] for ongoing physical rehabilitation.    While at [facility], was unable to bear much weight on right leg due to weakness and pain at " "discharge. Requiring EZ stands for transfers. Hospital bed ordered at discharge. Was not eating well. Acetaminophen ordered for pain control. Weight 204 lbs on 5/21/19. Repeat Hemoglobin 11.3 on 5/7/19.    Updates on Status Since Skilled nursing Admission:     Complains of right hip pain \"occasionally\" when sitting for long periods of time and transferring to bed. Denies numbness or tingling.     Per daughter, Shawna, report, cannot get patient in and out of car. Uncertain how they will get patient to Ortho appointment in June. Concerned that there was redness outlined around the incisions. Also noted while her mother was sitting on the toilet that 1 suture was not removed.     CODE STATUS/ADVANCE DIRECTIVES DISCUSSION:   DNR only  Patient's living condition: lives in an assisted living facility  ALLERGIES: Lisinopril; Morphine; Penicillins; and Sulfa drugs  PAST MEDICAL HISTORY:  has no past medical history on file.  PAST SURGICAL HISTORY:   has no past surgical history on file.  FAMILY HISTORY: family history is not on file.  SOCIAL HISTORY:       Post Discharge Medication Reconciliation Status: discharge medications reconciled and changed, per note/orders (see AVS)    Current Outpatient Medications   Medication Sig Dispense Refill     acetaminophen (TYLENOL) 500 MG tablet Take 1,000 mg by mouth daily And BID PRN       amLODIPine (NORVASC) 5 MG tablet Take 1 tablet (5 mg) by mouth daily 28 tablet 98     latanoprost (XALATAN) 0.005 % ophthalmic solution Place 1 drop into both eyes At Bedtime       losartan (COZAAR) 50 MG tablet Take 50 mg by mouth 2 times daily       melatonin 3 MG tablet Take 3 mg by mouth At Bedtime       metoprolol succinate ER (TOPROL-XL) 100 MG 24 hr tablet Take 100 mg by mouth daily Hold if SBP <100       senna (SENOKOT) 8.6 MG tablet Take 1 tablet by mouth 2 times daily as needed for constipation       ROS:  Limited secondary to cognitive impairment but today pt reports pain as noted " above    Vitals:  /64   Pulse 63   Temp 98.7  F (37.1  C)   Resp 16   Wt 87.5 kg (192 lb 12.8 oz)   Exam:  GENERAL APPEARANCE:  Alert, in no distress  ENT:  Mouth and posterior oropharynx normal, moist mucous membranes  EYES:  EOM, conjunctivae, lids, pupils and irises normal  RESP:  respiratory effort and palpation of chest normal, lungs clear to auscultation , no respiratory distress  CV:  Palpation and auscultation of heart done , regular rate and rhythm, no murmur, rub, or gallop  ABDOMEN:  normal bowel sounds, soft, nontender, no hepatosplenomegaly or other masses  M/S:   Active movement of bilateral upper and lower extremities. No edema.  SKIN:  3 healing right hip incisions. Mild erythema noted within perimeter of marker outline. No drainage. 1 suture present  NEURO:   Cranial nerves 2-12 are normal tested and grossly at patient's baseline  PSYCH:  memory impaired    Lab/Diagnostic data:  Labs done in SNF are in [facility]. Please refer to them using [employer]/Care Everywhere.    ASSESSMENT/PLAN:  Right Intertrochanteric Fracture of the Proximal Femur S/P ORIF on 5/2/19 by Dr. Hairston. [facility] HURD to follow patient per daughter's request. Daughter prefers patient be seen on June 11 as this is around the time patient would be seen by Dr. Hairston. Repeat x-rays to be performed at that time. Completed 3 weeks of Enoxaparin injections for DVT prophylaxis. Will schedule Acetaminophen daily in the morning for pain control. Has hospital bed for transfers. Home Physical Therapy ordered. Home PT does not have a set schedule so cannot schedule Acetaminophen to correlate with PT times. Incision with no signs of infection. Mild erythema is likely secondary to skin being pulled together.    Anemia due Blood Loss, Acute. Secondary to above. Hemoglobin 14 on 5/2/19 -> 10.7 on 5/5/19. Repeat Hemoglobin 11.3 on 5/7/19. Will repeat Hemoglobin in the coming weeks to ensure stability.     Vascular Dementia. Chronic,  progressive. Resides in [facility]. Staff to assist with meals and ADLs.      History of Left Basal Ganglia Hemorrhagic Stroke in 2018/CVA in . Discussed hospice during hospitalization in 2018, but patient appears to have made some progress in therapies. Patient and daughter report no residual effects.      Hypertension. Amlodipine increased from 5 mg to 10 mg on 3anem, but decreased back to 5 mg on 4/3/19 due to concerns related to increased edema. Monthly blood pressures. Continue Metoprolol and Losartan as ordered.      Bradycardia. Per history. Limited heart rates available to review since AL admission. Range 60s. Taking Metoprolol. Monitor heart rate monthly.      Chronic Systolic Heart Failure with EF 40-45% on 18. Monitor weights. Takes Metoprolol. Last weight documented 204 lbs on 19 at TCU.      Glaucoma. Continue Latanoprost as ordered. Daughter reported a follow-up appointment with Ophthalmologist had been scheduled for April.      Insomnia. Continue Melatonin as ordered.     Constipation. Continue Senna as ordered    Total time with an established patient visit in the assisted livin minutes including discussions about the POC and care coordination with the patient and family, daughter Fariba. Greater than 50% of total time spent with counseling and coordinating care due to discussion with daughter, contacting home PT to determine visit schedule, communicating with [facility] PA-C, reviewing TCU notes and hospital notes     Electronically signed by:  DAREN Nathan CNP                     Sincerely,        DAREN Nathan CNP

## 2019-06-01 RX ORDER — ACETAMINOPHEN 650 MG/20.3ML
975 LIQUID ORAL 3 TIMES DAILY PRN
COMMUNITY
End: 2020-01-01

## 2019-06-06 ENCOUNTER — TRANSFERRED RECORDS (OUTPATIENT)
Dept: HEALTH INFORMATION MANAGEMENT | Facility: CLINIC | Age: 84
End: 2019-06-06

## 2019-06-06 ENCOUNTER — TELEPHONE (OUTPATIENT)
Dept: GERIATRICS | Facility: CLINIC | Age: 84
End: 2019-06-06

## 2019-06-14 ENCOUNTER — PATIENT OUTREACH (OUTPATIENT)
Dept: GERIATRIC MEDICINE | Facility: CLINIC | Age: 84
End: 2019-06-14

## 2019-06-14 PROBLEM — Z76.89 HEALTH CARE HOME: Status: RESOLVED | Noted: 2019-03-18 | Resolved: 2019-06-14

## 2019-06-14 NOTE — PROGRESS NOTES
[employer] enrollee as of 4-1-19      Brenna [facility]NA[facility] Coordinator   [phone]

## 2019-06-14 NOTE — PROGRESS NOTES
6-14-19   [employer] chart review.   Member resides in Assisted Living and followed by onsite medical team for primary care.      Member had hospital stay 5/2/19 through 5/5/19. Patient admitted from [facility] TCU 5/5/19 through 5/22/19   and then discharge back to AL on 5-23.      CC was not notified until today with chart review so now MAGAN was completed but will open member to [facility] (Assisted Living)    Member identified and opened to case management per [facility] protocol.    PCP: Dee Mock  PCC: [facility]  Living arrangement:  Assisted Living apartment          Utilization History (last 12 months): ED , Inpatient, TCU stay     Medical History: No past medical history on file.      3 or 6 month follow up:  EPIC notes reviewed, call placed to AL facility for updates as needed.  UFS POC goals reviewed and updated.    Care Coordination Initial Assessment    Identity verified    Utilization:   Hospital Admits in last year: due to hip fracture and weakness     Health Maintenance Reviewed: NA    Current Medical Health Concerns:   Fracture     Plan:  CC will f/u with member via chart review as needed.      Brenna Peña MA [facility] Care Coordinator   [phone]

## 2019-06-18 ENCOUNTER — ASSISTED LIVING VISIT (OUTPATIENT)
Dept: GERIATRICS | Facility: CLINIC | Age: 84
End: 2019-06-18
Payer: COMMERCIAL

## 2019-06-18 ENCOUNTER — RECORDS - HEALTHEAST (OUTPATIENT)
Dept: LAB | Facility: CLINIC | Age: 84
End: 2019-06-18

## 2019-06-18 ENCOUNTER — TRANSFERRED RECORDS (OUTPATIENT)
Dept: HEALTH INFORMATION MANAGEMENT | Facility: CLINIC | Age: 84
End: 2019-06-18

## 2019-06-18 DIAGNOSIS — Z47.89 AFTERCARE FOLLOWING SURGERY OF THE MUSCULOSKELETAL SYSTEM: Primary | ICD-10-CM

## 2019-06-18 LAB
BASOPHILS # BLD AUTO: 0.1 THOU/UL (ref 0–0.2)
BASOPHILS NFR BLD AUTO: 1 % (ref 0–2)
DIFFERENTIAL: ABNORMAL
EOSINOPHIL # BLD AUTO: 0 THOU/UL (ref 0–0.4)
EOSINOPHIL NFR BLD AUTO: 0 % (ref 0–6)
ERYTHROCYTE [DISTWIDTH] IN BLOOD BY AUTOMATED COUNT: 12.7 % (ref 11–14.5)
ERYTHROCYTE [DISTWIDTH] IN BLOOD BY AUTOMATED COUNT: 12.7 % (ref 11–14.5)
HCT VFR BLD AUTO: 39.2 % (ref 35–47)
HCT VFR BLD AUTO: 39.2 % (ref 35–47)
HEMOGLOBIN: 12.5 G/DL (ref 12–16)
HGB BLD-MCNC: 12.5 G/DL (ref 12–16)
LYMPHOCYTES # BLD AUTO: 1.8 THOU/UL (ref 0.8–4.4)
LYMPHOCYTES NFR BLD AUTO: 23 % (ref 20–40)
MCH RBC QN AUTO: 31.7 PG (ref 27–34)
MCH RBC QN AUTO: 31.7 PG (ref 27–34)
MCHC RBC AUTO-ENTMCNC: 31.9 G/DL (ref 32–36)
MCHC RBC AUTO-ENTMCNC: 31.9 G/DL (ref 32–36)
MCV RBC AUTO: 100 FL (ref 80–100)
MCV RBC AUTO: 100 FL (ref 80–100)
MONOCYTES # BLD AUTO: 0.5 THOU/UL (ref 0–0.9)
MONOCYTES NFR BLD AUTO: 6 % (ref 2–10)
NEUTROPHILS # BLD AUTO: 5.3 THOU/UL (ref 2–7.7)
NEUTROPHILS NFR BLD AUTO: 70 % (ref 50–70)
PLATELET # BLD AUTO: 196 THOU/UL (ref 140–440)
PLATELET # BLD AUTO: 196 THOU/UL (ref 140–440)
PMV BLD AUTO: 10.3 FL (ref 8.5–12.5)
RBC # BLD AUTO: 3.94 MILL/UL (ref 3.8–5.4)
RBC # BLD AUTO: 3.94 MILL/UL (ref 3.8–5.4)
WBC # BLD AUTO: 7.4 THOU/UL (ref 4–11)
WBC: 7.7 THOU/UL (ref 4–11)

## 2019-06-18 NOTE — PROGRESS NOTES
"Ortho Nursing home visit    Radha Smith is a 89 year old female who resides at [facility].    Patient is seen today for Right hip fracture, now 6 weeks, S/P IM nail fixation for right inter-troch fracture; after fall. Patient also has left alysa- due to hip fracture;      No past medical history on file.   No past surgical history on file.     Allergies   Allergen Reactions     Lisinopril      Morphine      Penicillins      Sulfa Drugs       There were no vitals taken for this visit.     Exam: Able to stand with some help, mod, asst; of 1 to standing position with walker, no ability to ambulate today, however, patient stated \" I am walking\".    Allows PROM to right hip with no complaints, incisions all healed,  Neg Homans. CMS intact to RLE;            X-rays show; Long IM nail in good position;     ASSESSMENT / PLAN: Have left message with Dr. Florin boone office to review PPX x-rays done on-site, to determine if any changes in activity are needed. Goals, remain, no future falls, as patient has had 2 post-op.     It would be my eval today, that patient would not be independent with ambulation in the future.    [id number]  No diagnosis found.          [facility]  [phone]    "

## 2019-07-02 DIAGNOSIS — I10 BENIGN ESSENTIAL HYPERTENSION: Primary | ICD-10-CM

## 2019-07-03 RX ORDER — LOSARTAN POTASSIUM 50 MG/1
TABLET ORAL
Qty: 62 TABLET | Refills: 98 | Status: SHIPPED | OUTPATIENT
Start: 2019-07-03 | End: 2019-11-02

## 2019-07-03 RX ORDER — METOPROLOL SUCCINATE 100 MG/1
TABLET, EXTENDED RELEASE ORAL
Qty: 31 TABLET | Refills: 98 | Status: SHIPPED | OUTPATIENT
Start: 2019-07-03 | End: 2020-01-01 | Stop reason: ALTCHOICE

## 2019-07-05 DIAGNOSIS — H40.9 GLAUCOMA, UNSPECIFIED GLAUCOMA TYPE, UNSPECIFIED LATERALITY: Primary | ICD-10-CM

## 2019-07-07 RX ORDER — LATANOPROST 50 UG/ML
SOLUTION/ DROPS OPHTHALMIC
Qty: 2.5 ML | Refills: 97 | Status: SHIPPED | OUTPATIENT
Start: 2019-07-07 | End: 2020-01-01

## 2019-07-22 ENCOUNTER — TELEPHONE (OUTPATIENT)
Dept: GERIATRICS | Facility: CLINIC | Age: 84
End: 2019-07-22

## 2019-07-22 NOTE — TELEPHONE ENCOUNTER
Patient's daughter called re: requests that scheduled Tylenol be stopped as patient no longer has hip pain to previous extent. Routed request to primary NP Dee Mock.     Electronically signed by DAREN Etienne GNP

## 2019-09-04 ENCOUNTER — PATIENT OUTREACH (OUTPATIENT)
Dept: GERIATRIC MEDICINE | Facility: CLINIC | Age: 84
End: 2019-09-04

## 2019-09-04 NOTE — PROGRESS NOTES
9-4-19   CC did chart review for member since she was opened to . Member has been doing well since discharge back to AL at this time.     CC will close member to CM and goals met and CC will f/u as needed.   Brenna Peña MA [facility] Coordinator   [phone]

## 2019-10-07 ASSESSMENT — MIFFLIN-ST. JEOR: SCORE: 1379.3

## 2019-10-29 VITALS
WEIGHT: 207.6 LBS | HEIGHT: 67 IN | HEART RATE: 70 BPM | SYSTOLIC BLOOD PRESSURE: 146 MMHG | RESPIRATION RATE: 18 BRPM | TEMPERATURE: 96.2 F | DIASTOLIC BLOOD PRESSURE: 80 MMHG | BODY MASS INDEX: 32.58 KG/M2

## 2019-10-29 PROBLEM — G93.40 ENCEPHALOPATHY: Status: ACTIVE | Noted: 2018-12-04

## 2019-10-29 PROBLEM — T68.XXXA HYPOTHERMIA: Status: ACTIVE | Noted: 2018-02-21

## 2019-10-29 PROBLEM — R53.1 GENERALIZED WEAKNESS: Status: ACTIVE | Noted: 2018-02-21

## 2019-10-29 PROBLEM — R11.10 VOMITING: Status: ACTIVE | Noted: 2019-10-29

## 2019-10-29 PROBLEM — I10 ESSENTIAL HYPERTENSION: Status: ACTIVE | Noted: 2018-12-01

## 2019-10-29 PROBLEM — I42.0 DILATED CARDIOMYOPATHY (H): Status: ACTIVE | Noted: 2019-10-29

## 2019-10-29 PROBLEM — S72.009A HIP FRACTURE (H): Status: ACTIVE | Noted: 2019-05-02

## 2019-10-29 PROBLEM — M54.50 CHRONIC RIGHT-SIDED LOW BACK PAIN WITHOUT SCIATICA: Status: ACTIVE | Noted: 2018-12-01

## 2019-10-29 PROBLEM — F01.50 VASCULAR DEMENTIA WITHOUT BEHAVIORAL DISTURBANCE (H): Status: ACTIVE | Noted: 2018-12-04

## 2019-10-29 PROBLEM — I61.9 HEMORRHAGIC STROKE (H): Status: ACTIVE | Noted: 2018-09-03

## 2019-10-29 PROBLEM — T79.6XXA TRAUMATIC RHABDOMYOLYSIS (H): Status: ACTIVE | Noted: 2018-02-21

## 2019-10-29 PROBLEM — G89.29 CHRONIC RIGHT-SIDED LOW BACK PAIN WITHOUT SCIATICA: Status: ACTIVE | Noted: 2018-12-01

## 2019-10-29 PROBLEM — E87.20 LACTIC ACIDOSIS: Status: ACTIVE | Noted: 2018-02-21

## 2019-10-29 PROBLEM — Z51.5 PALLIATIVE CARE ENCOUNTER: Status: ACTIVE | Noted: 2019-10-29

## 2019-10-29 PROBLEM — Z86.73 HISTORY OF STROKE: Status: ACTIVE | Noted: 2018-12-02

## 2019-10-29 NOTE — PROGRESS NOTES
[place] [facility]  Chief Complaint   Patient presents with     Annual Comprehensive Exam Assisted Living     [place] Medical Record Number:  [id number]  Place of Service where encounter took place:  [facility] ([facility] [[id number]]    HPI:    Radha Smith  is a 89 year old  (12/14/1925), who is being seen today for an annual comprehensive visit. HPI information obtained from: facility chart records, facility staff, patient report and [facility] Epic chart review.  Today's concerns are:    Vascular Dementia. Able to make needs known. Has used Desitin instead of toothpaste to brush teeth in the past 6 months. Refused medications intermittently in the past. On 9/29/19, attempted to hit staff. No recent cognitive testing available to review.      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 Discussed hospice during hospitalization in September 2018, but patient appears to have made some progress in therapies. Patient and daughter report no residual effects.     History of Right Intertrochanteric Fracture of the Proximal Femur S/P ORIF on 5/2/19 by Dr. Hairston. Following a fall. Followed by [facility] PA-C Last seen 6/18/19.       Hypertension. Amlodipine increased from 5 mg to 10 mg on 3anemia/13/19, but decreased back to 5 mg on 4/3/19 due to concerns related to increased edema. Upon review of blood pressure over the past month, systolic range from 135-146. Diastolic range from 80-86.     Chronic Systolic Heart Failure with EF 40-45% on 2/21/18. Denies shortness of breath. Weights 192.8 lbs on 12/21/18 -> 230 on 6/3/19 -> 207.6 lbs on 10/7/19.      Glaucoma. No recent vision changes noted.     Insomnia. No reports of trouble sleeping.     Constipation. No reports of difficulty with bowels.    Recurrent Falls. Fell on 9/13/19. Found on the floor by staff. Reported sliding out of wheelchair. On 10/6/19, found sitting on floor in apartment next  to wheelchair.     Wart. Daughter questions growth on top of right hand. States its scraping things. Per patient report, is not painful.     ALLERGIES: Lisinopril; Morphine; Penicillins; and Sulfa drugs  PAST MEDICAL HISTORY:  has a past medical history of Glaucoma, Hypertension, Obesity, TIA (transient ischemic attack), and Vascular dementia with behavior disturbance (H) (12/04/2018).  PAST SURGICAL HISTORY:  has a past surgical history that includes hip surgery (2000).  IMMUNIZATIONS:  Immunization History   Administered Date(s) Administered     Pneumo Conj 13-V (2010&after) 12/05/2018     Pneumococcal 23 valent 12/06/2018     Above immunizations pulled from tidy. [facility]IC and facility records also reconciled. Outstanding information sent to  to update [employer].  Future immunizations needed:  TDAP and Will check with family when this was last administered.    Current Outpatient Medications   Medication Sig Dispense Refill     acetaminophen (TYLENOL) 500 MG tablet Take 1,000 mg by mouth 3 times daily as needed for pain        amLODIPine (NORVASC) 5 MG tablet Take 1 tablet (5 mg) by mouth daily 28 tablet 98     latanoprost (XALATAN) 0.005 % ophthalmic solution INSTILL ONE DROP IN EACH EYE EVERY NIGHT AT BEDTIME 2.5 mL 97     melatonin 3 MG tablet Take 3 mg by mouth At Bedtime       metoprolol succinate ER (TOPROL-XL) 100 MG 24 hr tablet TAKE 1 TABLET BY MOUTH ONCE DAILY 31 tablet 98     senna (SENOKOT) 8.6 MG tablet Take 1 tablet by mouth 2 times daily as needed for constipation       latanoprost (XALATAN) 0.005 % ophthalmic solution Place 1 drop into both eyes At Bedtime       losartan (COZAAR) 50 MG tablet TAKE 1 TABLET BY MOUTH TWICE DAILY 62 tablet 98     Case Management:  I have reviewed the Assisted Living care plan, current immunizations and preventive care/cancer screening. .Future cancer screening is not clinically indicated secondary to age/goals of care Patient's desire to return  "to the community is present, but is not able due to care needs . Current Level of Care is appropriate.    Advance Directive Discussion:    I reviewed the current advanced directives as reflected in EPIC, the POLST and the facility chart, and verified the congruency of orders. I contacted the first party daughter, Fariba and discussed the plan of Care.  I did review the advance directives with the resident.     Team Discussion:  I communicated with the appropriate disciplines involved with the Plan of Care:   Nursing    Patient's goal is pain control and comfort.  Information reviewed:  Medications, vital signs, orders, and nursing notes.    ROS:  4 point ROS including Respiratory, CV, GI and , other than that noted in the HPI,  is negative    Vitals:  BP (!) 146/80   Pulse 70   Temp 96.2  F (35.7  C)   Resp 18   Ht 1.702 m (5' 7\")   Wt 94.2 kg (207 lb 9.6 oz)   BMI 32.51 kg/m   Body mass index is 32.51 kg/m .  Exam:  GENERAL APPEARANCE:  Alert, in no distress  ENT:  Mouth and posterior oropharynx normal, moist mucous membranes  EYES:  EOM, conjunctivae, lids, pupils and irises normal  RESP:  respiratory effort and palpation of chest normal, lungs clear to auscultation , no respiratory distress  CV:  Palpation and auscultation of heart done , regular rate and rhythm, no murmur, rub, or gallop  ABDOMEN:  normal bowel sounds, soft, nontender, no hepatosplenomegaly or other masses  M/S:   Active movement of bilateral upper and lower extremities. Trace edema in BLE.  SKIN:  scaly raised papule on dorsal aspect of right hand  NEURO:   Cranial nerves 2-12 are normal tested and grossly at patient's baseline  PSYCH:  oriented to person     Lab/Diagnostic data:   Labs done in SNF are in [facility]. Please refer to them using [employer]/Care Everywhere.    ASSESSMENT/PLAN  Vascular Dementia. Chronic, progressive. Resides in secure memory care AL. Staff to assist with meals and ADLs.      History of Left Basal Ganglia " Hemorrhagic Stroke in September 2018/CVA in 2011. Discussed hospice during hospitalization in September 2018, but patient appears to have made some progress in therapies. Patient and daughter report no residual effects.     History of Right Intertrochanteric Fracture of the Proximal Femur S/P ORIF on 5/2/19 by Dr. Hairston. Following a fall. Staff to continue to monitor help prevent future falls.      Hypertension. Amlodipine increased from 5 mg to 10 mg on 3/13/19, but decreased back to 5 mg on 4/3/19 due to concerns related to increased edema. Blood pressures < 150/90. Monthly blood pressures monthly. Continue Metoprolol and Losartan as ordered.      Chronic Systolic Heart Failure with EF 40-45% on 2/21/18. Weights variable upon review of documentation since AL admission. Appears euvolemic on exam. Monitor weights to establish trend. Takes Metoprolol.      Glaucoma. Continue Latanoprost as ordered.      Insomnia. Continue Melatonin as ordered.     Constipation. Continue Senna as ordered    Recurrent Falls. Staff to continue to provide assistance as needed to help prevent falls.     Wart. On dorsal aspect of right hand. Will get Nitrogen from office and freeze wart.     Electronically signed by:  DAREN Nathan CNP

## 2019-10-30 ENCOUNTER — ASSISTED LIVING VISIT (OUTPATIENT)
Dept: GERIATRICS | Facility: CLINIC | Age: 84
End: 2019-10-30
Payer: COMMERCIAL

## 2019-10-30 DIAGNOSIS — I10 HYPERTENSION, UNSPECIFIED TYPE: ICD-10-CM

## 2019-10-30 DIAGNOSIS — F01.518 VASCULAR DEMENTIA WITH BEHAVIOR DISTURBANCE (H): Primary | ICD-10-CM

## 2019-10-30 DIAGNOSIS — B07.9 VIRAL WARTS, UNSPECIFIED TYPE: ICD-10-CM

## 2019-10-30 DIAGNOSIS — G47.00 INSOMNIA, UNSPECIFIED TYPE: ICD-10-CM

## 2019-10-30 DIAGNOSIS — R29.6 RECURRENT FALLS: ICD-10-CM

## 2019-10-30 DIAGNOSIS — Z86.73 HISTORY OF CVA (CEREBROVASCULAR ACCIDENT): ICD-10-CM

## 2019-10-30 DIAGNOSIS — K59.00 CONSTIPATION, UNSPECIFIED CONSTIPATION TYPE: ICD-10-CM

## 2019-10-30 DIAGNOSIS — H40.9 GLAUCOMA, UNSPECIFIED GLAUCOMA TYPE, UNSPECIFIED LATERALITY: ICD-10-CM

## 2019-10-30 DIAGNOSIS — I50.22 CHRONIC SYSTOLIC HEART FAILURE (H): ICD-10-CM

## 2019-10-30 NOTE — LETTER
10/30/2019        RE: Radha Smith  [address] [address]        [place] [facility]  Chief Complaint   Patient presents with     Annual Comprehensive Exam Assisted Living     [place] Medical Record Number:  [id number]  Place of Service where encounter took place:  [facility] ([facility] [[id number]]    HPI:    Radha Smith  is a 89 year old  (12/14/1925), who is being seen today for an annual comprehensive visit. HPI information obtained from: facility chart records, facility staff, patient report and [facility] chart review.  Today's concerns are:    Vascular Dementia. Able to make needs known. Has used Desitin instead of toothpaste to brush teeth in the past 6 months. Refused medications intermittently in the past. On 9/29/19, attempted to hit staff. No recent cognitive testing available to review.      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 Discussed hospice during hospitalization in September 2018, but patient appears to have made some progress in therapies. Patient and daughter report no residual effects.     History of Right Intertrochanteric Fracture of the Proximal Femur S/P ORIF on 5/2/19 by Dr. Hairston. Following a fall. Followed by [facility] PA-C Last seen 6/18/19.       Hypertension. Amlodipine increased from 5 mg to 10 mg on 3anemia/13/19, but decreased back to 5 mg on 4/3/19 due to concerns related to increased edema.  Upon review of blood pressure over the past month, systolic range from 135-146. Diastolic range from 80-86.     Chronic Systolic Heart Failure with EF 40-45% on 2/21/18. Denies shortness of breath. Weights 192.8 lbs on 12/21/18 -> 230 on 6/3/19 -> 207.6 lbs on 10/7/19.      Glaucoma. No recent vision changes noted.     Insomnia. No reports of trouble sleeping.     Constipation. No reports of difficulty with bowels.    Recurrent Falls. Fell on 9/13/19. Found on the floor by staff.  Reported sliding out of wheelchair. On 10/6/19, found sitting on floor in apartment next to wheelchair.     Wart. Daughter questions growth on top of right hand. States its scraping things. Per patient report, is not painful.     ALLERGIES: Lisinopril; Morphine; Penicillins; and Sulfa drugs  PAST MEDICAL HISTORY:  has a past medical history of Glaucoma, Hypertension, Obesity, TIA (transient ischemic attack), and Vascular dementia with behavior disturbance (H) (12/04/2018).  PAST SURGICAL HISTORY:  has a past surgical history that includes hip surgery (2000).  IMMUNIZATIONS:  Immunization History   Administered Date(s) Administered     Pneumo Conj 13-V (2010&after) 12/05/2018     Pneumococcal 23 valent 12/06/2018     Above immunizations pulled from [employer]. MIIC and facility records also reconciled. Outstanding information sent to  to update [employer].  Future immunizations needed:  TDAP and Will check with family when this was last administered.    Current Outpatient Medications   Medication Sig Dispense Refill     acetaminophen (TYLENOL) 500 MG tablet Take 1,000 mg by mouth 3 times daily as needed for pain        amLODIPine (NORVASC) 5 MG tablet Take 1 tablet (5 mg) by mouth daily 28 tablet 98     latanoprost (XALATAN) 0.005 % ophthalmic solution INSTILL ONE DROP IN EACH EYE EVERY NIGHT AT BEDTIME 2.5 mL 97     melatonin 3 MG tablet Take 3 mg by mouth At Bedtime       metoprolol succinate ER (TOPROL-XL) 100 MG 24 hr tablet TAKE 1 TABLET BY MOUTH ONCE DAILY 31 tablet 98     senna (SENOKOT) 8.6 MG tablet Take 1 tablet by mouth 2 times daily as needed for constipation       latanoprost (XALATAN) 0.005 % ophthalmic solution Place 1 drop into both eyes At Bedtime       losartan (COZAAR) 50 MG tablet TAKE 1 TABLET BY MOUTH TWICE DAILY 62 tablet 98     Case Management:  I have reviewed the Assisted Living care plan, current immunizations and preventive care/cancer screening. .Future cancer  "screening is not clinically indicated secondary to age/goals of care Patient's desire to return to the community is present, but is not able due to care needs . Current Level of Care is appropriate.    Advance Directive Discussion:    I reviewed the current advanced directives as reflected in EPIC, the POLST and the facility chart, and verified the congruency of orders. I contacted the first party daughter, Fariba and discussed the plan of Care.  I did review the advance directives with the resident.     Team Discussion:  I communicated with the appropriate disciplines involved with the Plan of Care:   Nursing    Patient's goal is pain control and comfort.  Information reviewed:  Medications, vital signs, orders, and nursing notes.    ROS:  4 point ROS including Respiratory, CV, GI and , other than that noted in the HPI,  is negative    Vitals:  BP (!) 146/80   Pulse 70   Temp 96.2  F (35.7  C)   Resp 18   Ht 1.702 m (5' 7\")   Wt 94.2 kg (207 lb 9.6 oz)   BMI 32.51 kg/m    Body mass index is 32.51 kg/m .  Exam:  GENERAL APPEARANCE:  Alert, in no distress  ENT:  Mouth and posterior oropharynx normal, moist mucous membranes  EYES:  EOM, conjunctivae, lids, pupils and irises normal  RESP:  respiratory effort and palpation of chest normal, lungs clear to auscultation , no respiratory distress  CV:  Palpation and auscultation of heart done , regular rate and rhythm, no murmur, rub, or gallop  ABDOMEN:  normal bowel sounds, soft, nontender, no hepatosplenomegaly or other masses  M/S:   Active movement of bilateral upper and lower extremities. Trace edema in BLE.  SKIN:  scaly raised papule on dorsal aspect of right hand  NEURO:   Cranial nerves 2-12 are normal tested and grossly at patient's baseline  PSYCH:  oriented to person     Lab/Diagnostic data:   Labs done in SNF are in [facility]. Please refer to them using [employer]/Care Everywhere.    ASSESSMENT/PLAN  Vascular Dementia. Chronic, progressive. Resides in " [facility] memory [facility]. Staff to assist with meals and ADLs.      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 Discussed hospice during hospitalization in September 2018, but patient appears to have made some progress in therapies. Patient and daughter report no residual effects.     History of Right Intertrochanteric Fracture of the Proximal Femur S/P ORIF on 5/2/19 by Dr. Hairston. Following a fall. Staff to continue to monitor help prevent future falls.      Hypertension. Amlodipine increased from 5 mg to 10 mg on 3/13/19, but decreased back to 5 mg on 4/3/19 due to concerns related to increased edema.  Blood pressures < 150/90. Monthly blood pressures monthly. Continue Metoprolol and Losartan as ordered.      Chronic Systolic Heart Failure with EF 40-45% on 2/21/18. Weights variable upon review of documentation since AL admission. Appears euvolemic on exam. Monitor weights to establish trend. Takes Metoprolol.      Glaucoma. Continue Latanoprost as ordered.      Insomnia. Continue Melatonin as ordered.     Constipation. Continue Senna as ordered    Recurrent Falls. Staff to continue to provide assistance as needed to help prevent falls.     Wart. On dorsal aspect of right hand. Will get Nitrogen from office and freeze wart.     Electronically signed by:  DRAEN Nathan CNP        Sincerely,        DAREN Nathan CNP

## 2019-11-13 NOTE — PROGRESS NOTES
[place] [facility]  [facility] Record Number:  [id number]  Place of Service where encounter took place:  [facility] ([facility]) [[id number]]  Chief Complaint   Patient presents with     RECHECK     HPI:    Radha Smith  is a 89 year old (12/14/1925), who is being seen today for an episodic care visit.  HPI information obtained from: facility chart records, facility staff, patient report and [facility] chart review. Today's concern is:    Common Wart. First noted on 10/30/19 visit on right hand. Today, patient is in agreement with cryotherapy using Veruca Freeze to remove wart from right hand. Contacted daughter, Chapis. Previously via telephone who was in agreement with having wart removed. Did not feel daughter needed to be present during visit.     Morbid Obesity. BMI 35.02. Upon review of documentation, BMI 30-35 over the past year. Weights 192.8 lbs on 12/21/18 -> 230 on 6/3/19 -> 207.6 lbs on 10/7/19 -> 210.2 lbs on 11/4/19 -> 223 lbs on 11/12/19.    Depression. Per daughter's report, patient has been more depressed lately. Has been crying more often. Sitting in room alone with shades down at times. Used to love to go to the music activity and is now leaving group early. States her mother was like this in the past after her  passed away in 2000. Was on Zoloft at the time with improvement in mood. Daughter requests this be re-initiated.     Past Medical and Surgical History reviewed in Epic today.    MEDICATIONS:  Current Outpatient Medications   Medication Sig Dispense Refill     acetaminophen (TYLENOL) 500 MG tablet Take 1,000 mg by mouth 3 times daily as needed for pain        amLODIPine (NORVASC) 5 MG tablet Take 1 tablet (5 mg) by mouth daily 28 tablet 98     latanoprost (XALATAN) 0.005 % ophthalmic solution INSTILL ONE DROP IN EACH EYE EVERY NIGHT AT BEDTIME 2.5 mL 97     melatonin 3 MG tablet Take 3 mg by mouth At Bedtime       metoprolol  succinate ER (TOPROL-XL) 100 MG 24 hr tablet TAKE 1 TABLET BY MOUTH ONCE DAILY 31 tablet 98     senna (SENOKOT) 8.6 MG tablet Take 1 tablet by mouth 2 times daily as needed for constipation       REVIEW OF SYSTEMS:  4 point ROS including Respiratory, CV, GI and , other than that noted in the HPI,  is negative    Objective:  /69   Pulse 62   Temp 98  F (36.7  C)   Resp 18   Wt 101.4 kg (223 lb 9.6 oz)   BMI 35.02 kg/m    Exam:  GENERAL APPEARANCE:  Alert, in no distress  ENT:  Mouth and posterior oropharynx normal, moist mucous membranes  EYES:  EOM, conjunctivae, lids, pupils and irises normal  RESP: No respiratory distress  M/S:   Active movement of bilateral upper and lower extremities.   SKIN:  Wart on dorsal aspect of right hand near near metacarpals  NEURO:   Cranial nerves 2-12 are normal tested and grossly at patient's baseline  PSYCH:  oriented to person    Labs:   Labs done in SNF are in [facility]. Please refer to them using [employer] Everywhere.    ASSESSMENT/PLAN:  Common Wart. Using Veruca Freeze Cryotherapy, applied cryobud to right hand wart for 25 seconds. Contacted daughter and explained if unsuccessful, will repeat treatment.    Morbid Obesity. Weights increased over the past month, but down from 6 months ago. Question accuracy of most recent weight given 13 lbs weight gain in ~ 1 week. Per patient and daughter's report in previous discussions, patient enjoys food at the facility. Appears euvolemic on exam. Continue to monitor weights.    Depression. Crying, isolating, less interest in activities. Will re-initiate Sertraline 25 mg PO every day after discussion with daughter. Repeat BMP in 2 week to ensure stability given side effect of hyponatremia.     Electronically signed by:  DAREN Nathan CNP

## 2019-11-13 NOTE — LETTER
11/13/2019        RE: Radha Smith  [address] [address]        [place] [facility]  [place] Medical Record Number:  [id number]  Place of Service where encounter took place:  [facility] [[id number]]  Chief Complaint   Patient presents with     RECHECK     HPI:    Radha Smith  is a 89 year old (12/14/1925), who is being seen today for an episodic care visit.  HPI information obtained from: facility chart records, facility staff, patient report and [facility] chart review. Today's concern is:    Common Wart. First noted on 10/30/19 visit on right hand. Today, patient is in agreement with cryotherapy using Veruca Freeze to remove wart from right hand. Contacted daughter, Chapis. Previously via telephone who was in agreement with having wart removed. Did not feel daughter needed to be present during visit.     Morbid Obesity. BMI 35.02. Upon review of documentation, BMI 30-35 over the past year. Weights 192.8 lbs on 12/21/18 -> 230 on 6/3/19 -> 207.6 lbs on 10/7/19 -> 210.2 lbs on 11/4/19 -> 223 lbs on 11/12/19.    Depression. Per daughter's report, patient has been more depressed lately. Has been crying more often. Sitting in room alone with shades down at times. Used to love to go to the music activity and is now leaving group early. States her mother was like this in the past after her  passed away in 2000. Was on Zoloft at the time with improvement in mood. Daughter requests this be re-initiated.     Past Medical and Surgical History reviewed in Epic today.    MEDICATIONS:  Current Outpatient Medications   Medication Sig Dispense Refill     acetaminophen (TYLENOL) 500 MG tablet Take 1,000 mg by mouth 3 times daily as needed for pain        amLODIPine (NORVASC) 5 MG tablet Take 1 tablet (5 mg) by mouth daily 28 tablet 98     latanoprost (XALATAN) 0.005 % ophthalmic solution INSTILL ONE DROP IN EACH EYE EVERY NIGHT AT BEDTIME  2.5 mL 97     melatonin 3 MG tablet Take 3 mg by mouth At Bedtime       metoprolol succinate ER (TOPROL-XL) 100 MG 24 hr tablet TAKE 1 TABLET BY MOUTH ONCE DAILY 31 tablet 98     senna (SENOKOT) 8.6 MG tablet Take 1 tablet by mouth 2 times daily as needed for constipation       REVIEW OF SYSTEMS:  4 point ROS including Respiratory, CV, GI and , other than that noted in the HPI,  is negative    Objective:  /69   Pulse 62   Temp 98  F (36.7  C)   Resp 18   Wt 101.4 kg (223 lb 9.6 oz)   BMI 35.02 kg/m     Exam:  GENERAL APPEARANCE:  Alert, in no distress  ENT:  Mouth and posterior oropharynx normal, moist mucous membranes  EYES:  EOM, conjunctivae, lids, pupils and irises normal  RESP:  No respiratory distress  M/S:   Active movement of bilateral upper and lower extremities.   SKIN:   Wart on dorsal aspect of right hand near near metacarpals  NEURO:   Cranial nerves 2-12 are normal tested and grossly at patient's baseline  PSYCH:  oriented to person    Labs:   [facility]F [facility]. Please refer to them using [employer] Everywhere.    ASSESSMENT/PLAN:  Common Wart. Using Veruca Freeze Cryotherapy, applied cryobud to right hand wart for 25 seconds. Contacted daughter and explained if unsuccessful, will repeat treatment.    Morbid Obesity. Weights increased over the past month, but down from 6 months ago. Question accuracy of most recent weight given 13 lbs weight gain in ~ 1 week. Per patient and daughter's report in previous discussions, patient enjoys food at the facility. Appears euvolemic on exam. Continue to monitor weights.    Depression. Crying, isolating, less interest in activities. Will re-initiate Sertraline 25 mg PO every day after discussion with daughter. Repeat BMP in 2 week to ensure stability given side effect of hyponatremia.     Electronically signed by:  DAREN Nathan CNP             Sincerely,        DAREN Nathan CNP

## 2019-11-17 PROBLEM — E66.01 MORBID OBESITY (H): Status: ACTIVE | Noted: 2019-01-01

## 2019-12-11 NOTE — LETTER
12/11/2019        RE: Radha Smiht  [address] [facility] [address]        [facility]  [place] Medical Record Number:  [id number]  Place of Service where encounter took place:  [facility] ([facility]) [[id number]]  Chief Complaint   Patient presents with     RECHECK     HPI:    Radha Smith  is a 89 year old (12/14/1925), who is being seen today for an episodic care visit.  HPI information obtained from: facility chart records, facility staff, patient report, [facility] chart review and family/first contact daughterChapis report. Today's concern is:    Common Wart. First noted on 10/30/19 visit on right hand. Today, patient is in agreement with cryotherapy using Veruca Freeze to remove wart from right hand. Contacted daughter, Chapis, prior to visit via telephone who was in agreement with having wart removed. Did not feel daughter needed to be present during visit. Used cryotherapy to remove wart on 11/13/19 and was unsuccessful. Will re-treat with Cryotherapy today.     Past Medical and Surgical History reviewed in Epic today.    MEDICATIONS:  Current Outpatient Medications   Medication Sig Dispense Refill     acetaminophen (TYLENOL) 500 MG tablet Take 1,000 mg by mouth 3 times daily as needed for pain        amLODIPine (NORVASC) 5 MG tablet Take 1 tablet (5 mg) by mouth daily 28 tablet 98     cholecalciferol (VITAMIN D3) 1000 units (25 mcg) capsule Take 1 capsule by mouth daily       latanoprost (XALATAN) 0.005 % ophthalmic solution INSTILL ONE DROP IN EACH EYE EVERY NIGHT AT BEDTIME 2.5 mL 97     melatonin 3 MG tablet Take 3 mg by mouth At Bedtime       metoprolol succinate ER (TOPROL-XL) 100 MG 24 hr tablet TAKE 1 TABLET BY MOUTH ONCE DAILY 31 tablet 98     senna (SENOKOT) 8.6 MG tablet Take 1 tablet by mouth 2 times daily as needed for constipation       sertraline (ZOLOFT) 25 MG tablet TAKE 1 TABLET BY MOUTH ONCE DAILY 31 tablet 98      REVIEW OF SYSTEMS:  Limited secondary to cognitive impairment but today pt reports no pain.    Objective:  BP (!) 146/60   Pulse 60   Temp 98.5  F (36.9  C)   Wt 97.5 kg (215 lb)   BMI 33.67 kg/m     Exam:  GENERAL APPEARANCE:  Alert, in no distress  ENT:  Mouth and posterior oropharynx normal, moist mucous membranes  EYES:  EOM, conjunctivae, lids, pupils and irises normal  RESP: No respiratory distress  M/S:   Active movement of bilateral upper and lower extremities.   SKIN:  Wart on dorsal aspect of right hand near near metacarpal  NEURO:   Cranial nerves 2-12 are normal tested and grossly at patient's baseline  PSYCH:  oriented to person     Labs:   Labs done in SNF are in [place] EPIC. Please refer to them using [employer]/Care Everywhere.    ASSESSMENT/PLAN:  Common Wart. Using Veruca Freeze Cryotherapy, applied cryobud to right hand wart for ~ 25 seconds and ~ 10 seconds. Patient felt pain during procedure and moved hand. Contacted daughter following procedure.     Electronically signed by:  DAREN Nathan CNP           Sincerely,        DAREN Nathan CNP

## 2019-12-12 NOTE — PROGRESS NOTES
[facility]  [place] Medical Record Number:  [id number]  Place of Service where encounter took place:  [facility] ([facility]) [[id number]]  Chief Complaint   Patient presents with     RECHECK     HPI:    Radha Smith  is a 89 year old (12/14/1925), who is being seen today for an episodic care visit.  HPI information obtained from: facility chart records, facility staff, patient report, [facility] chart review and family/first contact daughterChapis report. Today's concern is:    Common Wart. First noted on 10/30/19 visit on right hand. Today, patient is in agreement with cryotherapy using Veruca Freeze to remove wart from right hand. Contacted daughter, Chapis, prior to visit via telephone who was in agreement with having wart removed. Did not feel daughter needed to be present during visit. Used cryotherapy to remove wart on 11/13/19 and was unsuccessful. Will re-treat with Cryotherapy today.     Past Medical and Surgical History reviewed in Epic today.    MEDICATIONS:  Current Outpatient Medications   Medication Sig Dispense Refill     acetaminophen (TYLENOL) 500 MG tablet Take 1,000 mg by mouth 3 times daily as needed for pain        amLODIPine (NORVASC) 5 MG tablet Take 1 tablet (5 mg) by mouth daily 28 tablet 98     cholecalciferol (VITAMIN D3) 1000 units (25 mcg) capsule Take 1 capsule by mouth daily       latanoprost (XALATAN) 0.005 % ophthalmic solution INSTILL ONE DROP IN EACH EYE EVERY NIGHT AT BEDTIME 2.5 mL 97     melatonin 3 MG tablet Take 3 mg by mouth At Bedtime       metoprolol succinate ER (TOPROL-XL) 100 MG 24 hr tablet TAKE 1 TABLET BY MOUTH ONCE DAILY 31 tablet 98     senna (SENOKOT) 8.6 MG tablet Take 1 tablet by mouth 2 times daily as needed for constipation       sertraline (ZOLOFT) 25 MG tablet TAKE 1 TABLET BY MOUTH ONCE DAILY 31 tablet 98     REVIEW OF SYSTEMS:  Limited secondary to cognitive impairment but today pt reports no  pain.    Objective:  BP (!) 146/60   Pulse 60   Temp 98.5  F (36.9  C)   Wt 97.5 kg (215 lb)   BMI 33.67 kg/m    Exam:  GENERAL APPEARANCE:  Alert, in no distress  ENT:  Mouth and posterior oropharynx normal, moist mucous membranes  EYES:  EOM, conjunctivae, lids, pupils and irises normal  RESP: No respiratory distress  M/S:   Active movement of bilateral upper and lower extremities.   SKIN:  Wart on dorsal aspect of right hand near near metacarpal  NEURO:   Cranial nerves 2-12 are normal tested and grossly at patient's baseline  PSYCH:  oriented to person     Labs:   Labs done in SNF are in [place] EPIC. Please refer to them using [employer]/Care Everywhere.    ASSESSMENT/PLAN:  Common Wart. Using Veruca Freeze Cryotherapy, applied cryobud to right hand wart for ~ 25 seconds and ~ 10 seconds. Patient felt pain during procedure and moved hand. Contacted daughter following procedure.    ADDENDUM: Spoke to daughter, Chapis, while at facility on 12/27/19. Daughter noted no improvement in lesion on right hand since cryotherapy performed. Recommended follow-up with Dermatology.      Electronically signed by:  DAREN Nathan CNP

## 2019-12-31 NOTE — TELEPHONE ENCOUNTER
Spoke to daughters, Chapis and Fariba, regarding patient's edema. States it is causing patient to be uncomfortable. No shortness of breath. Weight down from last month, 223 lbs on 11/12/19 -> 215 lbs on 12/2/19. Discussed options such as compression stockings, diuretics and/or adjustment in Amlodipine. Daughter reports her  was on Amlodipine and as soon as it was stopped, his edema improved. Agreed to reduce Amlodipine to 2.5 mg PO daily. Will also change timing from morning to night. Staff to monitor blood pressure with medication administration. Daughters to communicate if symptoms do not improve

## 2020-01-01 ENCOUNTER — TRANSFERRED RECORDS (OUTPATIENT)
Dept: HEALTH INFORMATION MANAGEMENT | Facility: CLINIC | Age: 85
End: 2020-01-01

## 2020-01-01 ENCOUNTER — VIRTUAL VISIT (OUTPATIENT)
Dept: GERIATRICS | Facility: CLINIC | Age: 85
End: 2020-01-01
Payer: COMMERCIAL

## 2020-01-01 ENCOUNTER — RECORDS - HEALTHEAST (OUTPATIENT)
Dept: LAB | Facility: CLINIC | Age: 85
End: 2020-01-01

## 2020-01-01 ENCOUNTER — PATIENT OUTREACH (OUTPATIENT)
Dept: GERIATRIC MEDICINE | Facility: CLINIC | Age: 85
End: 2020-01-01

## 2020-01-01 ENCOUNTER — TELEPHONE (OUTPATIENT)
Dept: GERIATRICS | Facility: CLINIC | Age: 85
End: 2020-01-01

## 2020-01-01 ENCOUNTER — TELEPHONE (OUTPATIENT)
Dept: PHARMACY | Facility: OTHER | Age: 85
End: 2020-01-01

## 2020-01-01 VITALS
BODY MASS INDEX: 44.79 KG/M2 | DIASTOLIC BLOOD PRESSURE: 86 MMHG | SYSTOLIC BLOOD PRESSURE: 178 MMHG | TEMPERATURE: 98.6 F | WEIGHT: 286 LBS | HEART RATE: 72 BPM | RESPIRATION RATE: 14 BRPM

## 2020-01-01 DIAGNOSIS — R60.0 LOCALIZED EDEMA: Primary | ICD-10-CM

## 2020-01-01 DIAGNOSIS — G47.00 INSOMNIA, UNSPECIFIED TYPE: ICD-10-CM

## 2020-01-01 DIAGNOSIS — I10 ESSENTIAL HYPERTENSION: ICD-10-CM

## 2020-01-01 DIAGNOSIS — R60.0 LOCALIZED EDEMA: ICD-10-CM

## 2020-01-01 DIAGNOSIS — I10 BENIGN ESSENTIAL HYPERTENSION: ICD-10-CM

## 2020-01-01 DIAGNOSIS — R60.0 LOWER LEG EDEMA: ICD-10-CM

## 2020-01-01 DIAGNOSIS — L98.9 SKIN ABNORMALITY: ICD-10-CM

## 2020-01-01 DIAGNOSIS — R60.0 LOWER LEG EDEMA: Primary | ICD-10-CM

## 2020-01-01 DIAGNOSIS — E55.9 VITAMIN D DEFICIENCY: ICD-10-CM

## 2020-01-01 DIAGNOSIS — R23.8 SKIN BREAKDOWN: Primary | ICD-10-CM

## 2020-01-01 DIAGNOSIS — I10 ESSENTIAL HYPERTENSION: Primary | ICD-10-CM

## 2020-01-01 DIAGNOSIS — F01.50 VASCULAR DEMENTIA WITHOUT BEHAVIORAL DISTURBANCE (H): Primary | ICD-10-CM

## 2020-01-01 DIAGNOSIS — K59.00 CONSTIPATION, UNSPECIFIED CONSTIPATION TYPE: ICD-10-CM

## 2020-01-01 DIAGNOSIS — H40.9 GLAUCOMA, UNSPECIFIED GLAUCOMA TYPE, UNSPECIFIED LATERALITY: ICD-10-CM

## 2020-01-01 DIAGNOSIS — R52 PAIN: ICD-10-CM

## 2020-01-01 LAB
25(OH)D3 SERPL-MCNC: 17.5 NG/ML (ref 30–80)
ANION GAP SERPL CALCULATED.3IONS-SCNC: 13 MMOL/L (ref 5–18)
ANION GAP SERPL CALCULATED.3IONS-SCNC: 13 MMOL/L (ref 5–18)
BUN SERPL-MCNC: 16 MG/DL (ref 8–28)
BUN SERPL-MCNC: 16 MG/DL (ref 8–28)
CALCIUM SERPL-MCNC: 9 MG/DL (ref 8.5–10.5)
CALCIUM SERPL-MCNC: 9 MG/DL (ref 8.5–10.5)
CHLORIDE BLD-SCNC: 105 MMOL/L (ref 98–107)
CHLORIDE SERPLBLD-SCNC: 105 MMOL/L (ref 98–107)
CO2 SERPL-SCNC: 25 MMOL/L (ref 22–31)
CO2 SERPL-SCNC: 25 MMOL/L (ref 22–31)
CREAT SERPL-MCNC: 0.92 MG/DL (ref 0.6–1.1)
CREAT SERPL-MCNC: 0.92 MG/DL (ref 0.6–1.1)
GFR SERPL CREATININE-BSD FRML MDRD: 57 ML/MIN/1.73M2
GFR SERPL CREATININE-BSD FRML MDRD: 57 ML/MIN/1.73M2
GLUCOSE BLD-MCNC: 153 MG/DL (ref 70–125)
GLUCOSE SERPL-MCNC: 153 MG/DL (ref 70–125)
POTASSIUM BLD-SCNC: 4.2 MMOL/L (ref 3.5–5)
POTASSIUM SERPL-SCNC: 4.2 MMOL/L (ref 3.5–5)
SARS-COV-2 BY NAA - HISTORICAL: NOT DETECTED
SARS-COV-2 BY NAA - HISTORICAL: NOT DETECTED
SARS-COV-2 SOURCE - HISTORICAL: NORMAL
SARS-COV-2 SOURCE - HISTORICAL: NORMAL
SODIUM SERPL-SCNC: 143 MMOL/L (ref 136–145)
SODIUM SERPL-SCNC: 143 MMOL/L (ref 136–145)

## 2020-01-01 PROCEDURE — 99207 ZZC CDG-MDM COMPONENT: MEETS LOW - DOWN CODED: CPT | Performed by: NURSE PRACTITIONER

## 2020-01-01 PROCEDURE — 99207 ZZC CDG-MDM COMPONENT: MEETS MODERATE - DOWN CODED: CPT | Performed by: NURSE PRACTITIONER

## 2020-01-01 RX ORDER — FUROSEMIDE 10 MG/ML
20 SOLUTION ORAL DAILY
Qty: 120 ML | Refills: 3 | Status: SHIPPED | OUTPATIENT
Start: 2020-01-01 | End: 2020-01-01 | Stop reason: ALTCHOICE

## 2020-01-01 RX ORDER — ACETAMINOPHEN 500 MG
1000 TABLET ORAL 3 TIMES DAILY PRN
Qty: 28 TABLET | Refills: 3 | Status: SHIPPED | OUTPATIENT
Start: 2020-01-01

## 2020-01-01 RX ORDER — SERTRALINE HYDROCHLORIDE 20 MG/ML
25 SOLUTION ORAL DAILY
COMMUNITY
End: 2020-01-01

## 2020-01-01 RX ORDER — FUROSEMIDE 20 MG
20 TABLET ORAL DAILY
Qty: 28 TABLET | Refills: 3 | Status: SHIPPED | OUTPATIENT
Start: 2020-01-01 | End: 2020-01-01

## 2020-01-01 RX ORDER — SERTRALINE HYDROCHLORIDE 20 MG/ML
25 SOLUTION ORAL DAILY
Qty: 60 ML | Refills: 3 | Status: SHIPPED | OUTPATIENT
Start: 2020-01-01 | End: 2020-01-01

## 2020-01-01 RX ORDER — SENNOSIDES 8.6 MG
1 TABLET ORAL 2 TIMES DAILY PRN
Qty: 28 TABLET | Refills: 3 | Status: SHIPPED | OUTPATIENT
Start: 2020-01-01

## 2020-01-01 RX ORDER — FUROSEMIDE 20 MG
20 TABLET ORAL DAILY
Qty: 5 TABLET | Refills: 0 | Status: SHIPPED | OUTPATIENT
Start: 2020-01-01 | End: 2020-01-01

## 2020-01-01 RX ORDER — ACETAMINOPHEN 650 MG/20.3ML
975 LIQUID ORAL 3 TIMES DAILY PRN
Qty: 118 ML | Refills: 3 | Status: SHIPPED | OUTPATIENT
Start: 2020-01-01 | End: 2020-01-01 | Stop reason: ALTCHOICE

## 2020-01-01 RX ORDER — AMLODIPINE BESYLATE 2.5 MG/1
TABLET ORAL
Qty: 31 TABLET | Status: SHIPPED | OUTPATIENT
Start: 2020-01-01 | End: 2020-01-01 | Stop reason: ALTCHOICE

## 2020-01-01 RX ORDER — FUROSEMIDE 20 MG
TABLET ORAL
Qty: 28 TABLET | Refills: 97 | Status: SHIPPED | OUTPATIENT
Start: 2020-01-01

## 2020-01-01 RX ORDER — CHOLECALCIFEROL (VITAMIN D3) 25 MCG
TABLET ORAL
Qty: 56 TABLET | Status: SHIPPED | OUTPATIENT
Start: 2020-01-01 | End: 2020-01-01

## 2020-01-01 RX ORDER — LATANOPROST 50 UG/ML
SOLUTION/ DROPS OPHTHALMIC
Qty: 2.5 ML | Refills: 97 | Status: SHIPPED | OUTPATIENT
Start: 2020-01-01

## 2020-01-01 RX ORDER — METOPROLOL SUCCINATE 100 MG/1
100 TABLET, EXTENDED RELEASE ORAL DAILY
Qty: 28 TABLET | Refills: 3 | Status: SHIPPED | OUTPATIENT
Start: 2020-01-01 | End: 2020-01-01 | Stop reason: ALTCHOICE

## 2020-01-01 RX ORDER — ANORECTAL OINTMENT 15.7; .44; 24; 20.6 G/100G; G/100G; G/100G; G/100G
OINTMENT TOPICAL
Qty: 113 G | Refills: 97 | Status: SHIPPED | OUTPATIENT
Start: 2020-01-01

## 2020-01-01 RX ORDER — LOSARTAN POTASSIUM 25 MG/1
TABLET ORAL
Qty: 28 TABLET | Refills: 99 | Status: SHIPPED | OUTPATIENT
Start: 2020-01-01

## 2020-01-01 RX ORDER — LANOLIN ALCOHOL/MO/W.PET/CERES
CREAM (GRAM) TOPICAL
Qty: 28 TABLET | Refills: 99 | Status: SHIPPED | OUTPATIENT
Start: 2020-01-01 | End: 2020-01-01 | Stop reason: ALTCHOICE

## 2020-01-01 RX ORDER — METOPROLOL SUCCINATE 100 MG/1
100 TABLET, EXTENDED RELEASE ORAL DAILY
Qty: 28 TABLET | Refills: 3 | Status: SHIPPED | OUTPATIENT
Start: 2020-01-01

## 2020-01-01 RX ORDER — FUROSEMIDE 10 MG/ML
20 SOLUTION ORAL DAILY
COMMUNITY
End: 2020-01-01

## 2020-01-01 RX ORDER — FUROSEMIDE 20 MG
TABLET ORAL
Qty: 28 TABLET | Refills: 99 | Status: SHIPPED | OUTPATIENT
Start: 2020-01-01 | End: 2020-01-01 | Stop reason: ALTCHOICE

## 2020-01-25 NOTE — TELEPHONE ENCOUNTER
Forgot to send lasix order to  long term pharmacy    Electronically signed by Carmen Whitehead RN, CNP

## 2020-01-25 NOTE — TELEPHONE ENCOUNTER
Received phone call from daughter about Radha's leg seeping fluid out on one area.  Had her have nursing call this NP    Nurse called and sounds like leg is swollen and seeping fluid.    Orders:  Lasix 20mg daily x5 days  Wrap area with 4x4 and wrap with kerlix  Wrap leg with ACE bandage on in am and off in pm from toes to below knee    Will leg regular np know    Electronically signed by Carmen Whitehead RN, CNP

## 2020-05-04 NOTE — LETTER
"    5/4/2020        [facility]  [address]  [facility] [address]        [facility]   Radha Smith is being evaluated via a billable video visit due to the restrictions of the Covid-19 pandemic.   The patient has been notified of following: \"This video visit will be conducted via a call between you and your provider. We have found that certain health care needs can be provided without the need for an in-person physical exam.  This service lets us provide the care you need with a video conversation. If during the course of the call the provider feels a video visit is not appropriate, you will not be charged for this service.\"   The provider has received verbal consent for a Video Visit from the patient or first contact? Yes  Patient  or facility staff would like the video invitation sent by: N/A   Video Start Time: 1619    [place] Medical Record Number:  [id number]  Place of Location at the time of visit: [facility]   Chief Complaint   Patient presents with     RECHECK     HPI:  Radha Smith  is a 89 year old (12/14/1925), who is being seen today for a visit.  HPI information obtained from: facility chart records, facility staff, patient report, [facility] chart review and family/first contact daughterChapis report. Today's concern is:    Vascular Dementia. Staff reported on 5/1/20 that patient was refusing to take medications. Have tried giving medications in juice and ice cream. Family questioned if patient needed to be on medications. Today, patient reports she does not like the taste of the green pill. Doesn't understand why she needs to take so many medications. Per daughter's report, question what medications are absolutely necessary. Would like to see if medications can be changed to capsules or liquid. States her mother has never taken many pills her entire life. Reports AL may be moving patient to a " higher level of care as getting patient to take medications is so time consuming. Upon review of documentation, has missed morning medication, Losartan, 12 times in the past month and evening medication, Melatonin, 14 times.    Hypertension. Upon review of blood pressure over the past 7 days, systolic range from 138-178. Diastolic range from 67-86. Over the past month, sbp 130-150s.     Bilateral Lower Extremity Edema. Per daughter's report, edema is improving since staring Furosemide on 1/31/20. Today, patient denies shortness of breath. Weight 215 lbs on 12/2/19 -> 289.6 lbs on 4/6/20 -> 286 lbs on 5/4/20.     Past Medical and Surgical History reviewed in Epic today.  MEDICATIONS:    Current Outpatient Medications   Medication Sig Dispense Refill     Acetaminophen 650 MG/20.3ML SOLN Take 975 mg by mouth 3 times daily as needed (Pain) 118 mL 3     amLODIPine benzoate (KATERZIA) 1 MG/ML SUSP Take 2.5 mg by mouth At Bedtime       furosemide (LASIX) 10 MG/ML solution Take 2 mLs (20 mg) by mouth daily 120 mL 3     latanoprost (XALATAN) 0.005 % ophthalmic solution INSTILL ONE DROP IN EACH EYE EVERY NIGHT AT BEDTIME 2.5 mL 97     LOSARTAN 25 MG PO tablet TAKE 1 TABLET BY MOUTH ONCE DAILY 28 tablet 99     melatonin (MELATONIN) 1 MG/ML LIQD liquid Take 3 mLs (3 mg) by mouth At Bedtime 58 mL 3     metoprolol succinate ER (TOPROL-XL) 100 MG 24 hr tablet Take 1 tablet (100 mg) by mouth daily 28 tablet 3     Sennosides 8.8 MG/ML LIQD Take 1 tablet by mouth 2 times daily as needed (Constipation) 30 mL 3     sertraline (ZOLOFT) 20 MG/ML (HIGH CONC) solution Take 1.25 mLs (25 mg) by mouth daily 60 mL 3     REVIEW OF SYSTEMS: 4 point ROS including Respiratory, CV, GI and , other than that noted in the HPI,  is negative  Objective: BP (!) 178/86   Pulse 72   Temp 98.6  F (37  C)   Resp 14   Wt 129.7 kg (286 lb)   BMI 44.79 kg/m    Limited visit exam done given COVID-19 precautions.   GENERAL APPEARANCE:  Alert, in no  distress  ENT:  Mouth and posterior oropharynx normal, moist mucous membranes  EYES:  EOM, conjunctivae, lids, pupils and irises normal  RESP:  no respiratory distress  M/S:   1-2+ edema in BLE  PSYCH:  memory impaired   Labs:   Labs done in SNF are in [place] EPIC. Please refer to them using [facility]/Care Everywhere.    ASSESSMENT/PLAN:  Vascular Dementia. Chronic, progressive. Resides in secure memory care AL. Staff to assist with meals and ADLs. As patient does not like to swallow medications, would like to reduce polypharmacy. Will discontinue Vitamin D per daughter's request. Will also try to discontinue Amlodipine if blood pressure tolerates. Attempted to change all medications to liquid or capsule form. Continue to monitor to determine if further decrease in medications is indicated.    Hypertension. With history of CVA. Blood pressures slightly above goal, but given age and fall risk, would like to avoid too tight of control. As patient's family would like to reduce polypharmacy, will try to discontinue Amlodipine if daughter is in agreement as dose is low and would help edema in lower legs. Continue Losartan and Metoprolol as ordered for now.  Monitor blood pressure and adjust accordingly.    Bilateral Lower Extremity Edema. Improved with initiation of Furosemide per daughter's report, but still remains. Is on Amlodipine as noted above. Would question accuracy of most recent weights given 74.6 lbs weight gain in 4 months. No shortness of breath. Will ask facility to re-weigh. Encourage elevation while at rest.     {Electronically signed by:  DAREN Nathan CNP     Video-Visit Details  Type of service:  Video Visit  Video End Time (time video stopped): 1625  Distant Location (provider location):  [place] [facility]             Sincerely,        DAREN Nathan CNP

## 2020-05-05 NOTE — PROGRESS NOTES
"[place] [facility]   Radha Smith is being evaluated via a billable video visit due to the restrictions of the Covid-19 pandemic.   The patient has been notified of following: \"This video visit will be conducted via a call between you and your provider. We have found that certain health care needs can be provided without the need for an in-person physical exam.  This service lets us provide the care you need with a video conversation. If during the course of the call the provider feels a video visit is not appropriate, you will not be charged for this service.\"   The provider has received verbal consent for a Video Visit from the patient or first contact? Yes  Patient  or facility staff would like the video invitation sent by: N/A   Video Start Time: 1619    [place] Medical Record Number:  [id number]  Place of Location at the time of visit: [facility]   Chief Complaint   Patient presents with     RECHECK     HPI:  Radha Smith  is a 89 year old (12/14/1925), who is being seen today for a visit.  HPI information obtained from: facility chart records, facility staff, patient report, [facility] chart review and family/first contact daughterChapis report. Today's concern is:    Vascular Dementia. Staff reported on 5/1/20 that patient was refusing to take medications. Have tried giving medications in juice and ice cream. Family questioned if patient needed to be on medications. Today, patient reports she does not like the taste of the green pill. Doesn't understand why she needs to take so many medications. Per daughter's report, question what medications are absolutely necessary. Would like to see if medications can be changed to capsules or liquid. States her mother has never taken many pills her entire life. Reports AL may be moving patient to a higher level of care as getting patient to take medications is so time consuming. Upon review of documentation, has " missed morning medication, Losartan, 12 times in the past month and evening medication, Melatonin, 14 times.    Hypertension. Upon review of blood pressure over the past 7 days, systolic range from 138-178. Diastolic range from 67-86. Over the past month, sbp 130-150s.     Bilateral Lower Extremity Edema. Per daughter's report, edema is improving since staring Furosemide on 1/31/20. Today, patient denies shortness of breath. Weight 215 lbs on 12/2/19 -> 289.6 lbs on 4/6/20 -> 286 lbs on 5/4/20.     Past Medical and Surgical History reviewed in Epic today.  MEDICATIONS:    Current Outpatient Medications   Medication Sig Dispense Refill     Acetaminophen 650 MG/20.3ML SOLN Take 975 mg by mouth 3 times daily as needed (Pain) 118 mL 3     amLODIPine benzoate (KATERZIA) 1 MG/ML SUSP Take 2.5 mg by mouth At Bedtime       furosemide (LASIX) 10 MG/ML solution Take 2 mLs (20 mg) by mouth daily 120 mL 3     latanoprost (XALATAN) 0.005 % ophthalmic solution INSTILL ONE DROP IN EACH EYE EVERY NIGHT AT BEDTIME 2.5 mL 97     LOSARTAN 25 MG PO tablet TAKE 1 TABLET BY MOUTH ONCE DAILY 28 tablet 99     melatonin (MELATONIN) 1 MG/ML LIQD liquid Take 3 mLs (3 mg) by mouth At Bedtime 58 mL 3     metoprolol succinate ER (TOPROL-XL) 100 MG 24 hr tablet Take 1 tablet (100 mg) by mouth daily 28 tablet 3     Sennosides 8.8 MG/ML LIQD Take 1 tablet by mouth 2 times daily as needed (Constipation) 30 mL 3     sertraline (ZOLOFT) 20 MG/ML (HIGH CONC) solution Take 1.25 mLs (25 mg) by mouth daily 60 mL 3     REVIEW OF SYSTEMS: 4 point ROS including Respiratory, CV, GI and , other than that noted in the HPI,  is negative  Objective: BP (!) 178/86   Pulse 72   Temp 98.6  F (37  C)   Resp 14   Wt 129.7 kg (286 lb)   BMI 44.79 kg/m    Limited visit exam done given COVID-19 precautions.   GENERAL APPEARANCE:  Alert, in no distress  ENT:  Mouth and posterior oropharynx normal, moist mucous membranes  EYES:  EOM, conjunctivae, lids, pupils and  irises normal  RESP:  no respiratory distress  M/S:   1-2+ edema in BLE  PSYCH:  memory impaired   Labs:   Labs done in SNF are in [facility]. Please refer to them using [employer]Care Everywhere.    ASSESSMENT/PLAN:  Vascular Dementia. Chronic, progressive. Resides in secure memory care AL. Staff to assist with meals and ADLs. As patient does not like to swallow medications, would like to reduce polypharmacy. Will discontinue Vitamin D per daughter's request. Will also try to discontinue Amlodipine if blood pressure tolerates. Attempted to change all medications to liquid or capsule form. Continue to monitor to determine if further decrease in medications is indicated.    Hypertension. With history of CVA. Blood pressures slightly above goal, but given age and fall risk, would like to avoid too tight of control. As patient's family would like to reduce polypharmacy, will try to discontinue Amlodipine if daughter is in agreement as dose is low and would help edema in lower legs. Continue Losartan and Metoprolol as ordered for now.  Monitor blood pressure and adjust accordingly.    Bilateral Lower Extremity Edema. Improved with initiation of Furosemide per daughter's report, but still remains. Is on Amlodipine as noted above. Would question accuracy of most recent weights given 74.6 lbs weight gain in 4 months. No shortness of breath. Will ask facility to re-weigh. Encourage elevation while at rest.     {Electronically signed by:  DAREN Nathan CNP     Video-Visit Details  Type of service:  Video Visit  Video End Time (time video stopped): 2225  Distant Location (provider location):  [place] [facility]

## 2020-05-06 NOTE — ORAL ONC MGMT
No orders under medication tab for the sprinkle capsules--it was reported by patient but not ordered by a [facility] provider.  [place] order provider ordered tablets.

## 2020-05-06 NOTE — TELEPHONE ENCOUNTER
Prior Authorization Retail Medication Request    Medication/Dose: Kapspargo 100mg  ICD code (if different than what is on RX):  N/A  Previously Tried and Failed:  N/A    Insurance Name:  [employer]  Insurance ID: [id number]  Group ID: [id number]  Bin: [id number]  Pcn: MD  Insurance Ph: [phone]      Pharmacy Information (if different than what is on RX)  Name: [facility]   Phone:  [phone]

## 2020-07-07 NOTE — TELEPHONE ENCOUNTER
"[facility] TELEPHONE ENCOUNTER    Chief Complaint   Patient presents with     Weight Loss     Radha Smith is a 89 year old  (12/14/1925). Daughter, Chapis, called today to question what patient's most recent weight was. Reports she's not eating. Daughters try to encourage her to eat at the window. Used to be a good eater. Upon review of documentation, weight 196 lbs on 7/6/20 -> 286 lbs on 5/4/20 -> 289.6 lbs on 4/6/20 -> 215 lbs on 12/2/19 -> 192.8 lbs on 12/21/18.     Discussed with daughter questionable accuracy of weights in May and April. Reviewed initiating medications for appetite stimulation, although patient does not like to take medications, versus goal of comfort care. Educated on hospice. Daughter states \"Oh no. She's not ready for hospice\". Talks about how she waxes and wanes in her cognition. Noticed her daughter had a new shirt. Does not want to start a medication for appetite. Thinks COVID pandemic and isolation is contributing to weight loss. Will continue to monitor.     Electronically signed by:   DAREN Nathan CNP    "

## 2020-10-12 NOTE — PROGRESS NOTES
TRANSITIONS OF CARE (MAGAN) LOG   MAGAN tasks should be completed by the CC within one (1) business day of notification of each transition. Follow up contact with member is required after return to their usual care setting.  Note:  If CC finds out about the transitions fifteen (15) days or more after the member has returned to their usual care setting, no MAGAN log is needed. However, the CC should check in with the member to discuss the transition process, any changes needed to the care plan and document it in a case note.    Member Name:  Radha Smith MCO Name:  [employer] MCO/Health Plan Member ID#: [id number]   Product: [employer] Medicare Care Coordinator Contact:  TONE Jonas Agency/[facility]/Care System: [facility]   Transition Communication Actions from Care Management Contact   Transition #1   Notification Date: 10-12-20 Transition Date:   10-11-20 Transition From: Assisted Living, [facility]     Is this the member s usual care setting?               yes Transition To: [facility], [facility]   Transition Type:  Unplanned  Reason for Admission/Comments:  Alerted by Epic:  Radha Smith admitted on 10/11/20 to [facility]  Hypoxia related to fluid infiltration of Rt lung  Summary: Hx of vascular dementia. Plan for thoracentesis today. Need consent for procedure? Please follow up. Daughters phone numbers listed below. Pt cannot tolerate laying down. Becomes very SOB.   Discharge: TBD. From memory care. Family expressed interest in hospice.  10-16-20 update: Radha Smith is still at [facility].  No admit plans to [facility], last they ([facility]) heard is an inquiry from the dtr on 10/14.  [facility] has not heard one way or another if she is coming back.  She is quite ill.      10-20-20 No updated notes from 10-15-20    10-22-20 per Epic:  Radha Smith discharged on 10/21 to home. It looks like she also admitted to [facility] on 10/21    Communication as per  process and EMR. Facility also in contact with family and dc planner       Shared CC contact info, care plan/services with receiving setting--Date completed: 10-12-20    Notified PCP of transition--Date completed:  10-11-20     via  EMR   Transition #2   Transition #3  (if applicable)   Notification Date: 10-22-20         Transition To:  Assisted Living, Assisted Living, [facility]  Transition Date: 10-21-20     Transition Type:    Planned  Notified PCP -- Date completed: 10-21-20              Shared CC contact info, care plan/services with receiving setting or, if applicable, home care agency--Date completed:  10-22-20  *Complete additional tasks below, if this transition is a return to usual care setting.      Comments:  : Information sent with member back to facility and EMR. Facility also in contact with family     Again per Epic:  discharged on 10/21 to home. It looks like she also admitted to [facility] on 10/21       Notification Date:          Transition To:    Transition Date:              Transition Type:      Notified PCP--Date completed:          Shared CC contact info, care plan/services with receiving setting or, if applicable, home care agency--Date completed:       *Complete additional tasks below, if this transition is a return to usual care setting.      Comments:       *Complete tasks below when the member is discharging TO their usual care setting within one (1) business day of notification.  For situations where the Care Coordinator is notified of the discharge prior to the date of discharge, the Care Coordinator must follow up with the member or designated representative to confirm that discharge actually occurred and discuss required MAGAN tasks as outlined in the MAGAN Instructions.  (This includes situations where it may be a  new  usual care setting for the member. (i.e., a community member who decides upon permanent nursing home placement following  hospitalization and rehab).    Date completed: 10-22-20  Communicated with member or their designated representative about the following:  care transition process; about changes to the member s health status; plan of care updates; education about transitions and how to prevent unplanned transitions/readmissions  Four Pillars for Optimal Transition:    Check  Yes  - if the member, family member and/or SNF/facility staff manages the following:    If  No  provide explanation in the comments section.          [x]  Yes     []  No     Does the member have a follow-up appointment scheduled with primary care or specialist? (Mental health hospitalizations--the appt. should be w/in 7 days)   [x]  Yes     []  No     Can the member manage their medications or is there a system in place to manage medications (e.g. home care set-up)?         [x]  Yes     []  No     Can the member verbalize warning signs and symptoms to watch for and how to respond?         [x]  Yes     []  No     Does the member use a Personal Health Care Record?  Check  Yes  if visit summary, discharge summary, and/or healthcare summary are being used as a PHR.                                                                                                                                                                                    [] Yes      [x] No      Have you updated the member s care plan?  If  No  provide explanation in comments.   Comments:  Lives in [facility] setting. Seen at bedside by NP/MD team as appropriate. Facility manages meds, records, POC and assessment of clinical status as needed

## 2020-12-29 ENCOUNTER — PATIENT OUTREACH (OUTPATIENT)
Dept: GERIATRIC MEDICINE | Facility: CLINIC | Age: 85
End: 2020-12-29

## 2020-12-30 NOTE — PROGRESS NOTES
[employer] Medicare Disenrollment    Member was disenrolled from [employer] Medicare effective: 11-12-20  Reason for disenrollment: Death     Brenna GILES   [facility] Care Coordinator   [phone] - work cell phone   [phone] - zdej fax